# Patient Record
Sex: FEMALE | Race: WHITE | NOT HISPANIC OR LATINO | URBAN - METROPOLITAN AREA
[De-identification: names, ages, dates, MRNs, and addresses within clinical notes are randomized per-mention and may not be internally consistent; named-entity substitution may affect disease eponyms.]

---

## 2017-07-31 ENCOUNTER — EMERGENCY (EMERGENCY)
Facility: HOSPITAL | Age: 62
LOS: 1 days | Discharge: HOME | End: 2017-07-31
Attending: EMERGENCY MEDICINE | Admitting: EMERGENCY MEDICINE
Payer: COMMERCIAL

## 2017-07-31 VITALS
SYSTOLIC BLOOD PRESSURE: 153 MMHG | HEART RATE: 80 BPM | OXYGEN SATURATION: 97 % | RESPIRATION RATE: 20 BRPM | TEMPERATURE: 99 F | DIASTOLIC BLOOD PRESSURE: 86 MMHG

## 2017-07-31 PROCEDURE — 73502 X-RAY EXAM HIP UNI 2-3 VIEWS: CPT | Mod: 26,LT

## 2017-07-31 PROCEDURE — 99284 EMERGENCY DEPT VISIT MOD MDM: CPT

## 2017-07-31 PROCEDURE — 73502 X-RAY EXAM HIP UNI 2-3 VIEWS: CPT

## 2017-07-31 PROCEDURE — 99283 EMERGENCY DEPT VISIT LOW MDM: CPT | Mod: 25

## 2017-07-31 NOTE — ED PROVIDER NOTE - CONSTITUTIONAL, MLM
normal... Appears distressed 2/2 pain, awake, alert, oriented to person, place, time/situation and in no apparent distress.

## 2017-07-31 NOTE — ED PROVIDER NOTE - ATTENDING CONTRIBUTION TO CARE
[age] yo female presents with left hip and left lower back pain x 1 week; started s/p working out on treadmill; no fevers; no numbness; no weakness; no pain traveling down leg; on exam nad, no spinal tenderness; + left paraspinal lumbar tenderness; left superior posterior pelvic rim ttp; no swelling; no warmth of hip; no femur tenderness; full strength; no neurovascular deficits; will obtain xray, pain; control; will likely be able to f/u with ortho tomorrow

## 2017-07-31 NOTE — ED PROVIDER NOTE - OBJECTIVE STATEMENT
61 yo F w/ pmh HLD p/w L hip pain x 1 wk. Pt taking ibuprofen, oxycodone w/ minor pain relief. Pt was unable to get an ortho appt so came to ED. Pt denies trauma, major strain on L hip. Pt denies radiation, pain elsewhere. Pt only able to ambulate if flexing spine forward. Pain worse w/ standing and walking (10/10), but still present w/ sitting (8/10).     Took advil 30 min before arriving at ED

## 2017-08-01 ENCOUNTER — APPOINTMENT (OUTPATIENT)
Dept: ORTHOPEDIC SURGERY | Facility: CLINIC | Age: 62
End: 2017-08-01
Payer: COMMERCIAL

## 2017-08-01 VITALS
BODY MASS INDEX: 26.58 KG/M2 | WEIGHT: 150 LBS | HEIGHT: 63 IN | HEART RATE: 79 BPM | DIASTOLIC BLOOD PRESSURE: 84 MMHG | SYSTOLIC BLOOD PRESSURE: 134 MMHG

## 2017-08-01 DIAGNOSIS — Z78.9 OTHER SPECIFIED HEALTH STATUS: ICD-10-CM

## 2017-08-01 DIAGNOSIS — Z86.39 PERSONAL HISTORY OF OTHER ENDOCRINE, NUTRITIONAL AND METABOLIC DISEASE: ICD-10-CM

## 2017-08-01 DIAGNOSIS — Z80.9 FAMILY HISTORY OF MALIGNANT NEOPLASM, UNSPECIFIED: ICD-10-CM

## 2017-08-01 PROCEDURE — 73502 X-RAY EXAM HIP UNI 2-3 VIEWS: CPT

## 2017-08-01 PROCEDURE — 20610 DRAIN/INJ JOINT/BURSA W/O US: CPT | Mod: LT

## 2017-08-01 PROCEDURE — 99204 OFFICE O/P NEW MOD 45 MIN: CPT | Mod: 25

## 2017-08-08 ENCOUNTER — APPOINTMENT (OUTPATIENT)
Dept: ORTHOPEDIC SURGERY | Facility: CLINIC | Age: 62
End: 2017-08-08
Payer: COMMERCIAL

## 2017-08-08 VITALS — HEIGHT: 63 IN | BODY MASS INDEX: 26.58 KG/M2 | WEIGHT: 150 LBS

## 2017-08-08 PROCEDURE — 99214 OFFICE O/P EST MOD 30 MIN: CPT

## 2018-07-17 ENCOUNTER — OTHER (OUTPATIENT)
Age: 63
End: 2018-07-17

## 2018-07-22 PROBLEM — Z78.9 ALCOHOL USE: Status: ACTIVE | Noted: 2017-08-01

## 2018-12-10 PROBLEM — E78.5 HYPERLIPIDEMIA, UNSPECIFIED: Chronic | Status: ACTIVE | Noted: 2017-07-31

## 2019-01-04 ENCOUNTER — NON-APPOINTMENT (OUTPATIENT)
Age: 64
End: 2019-01-04

## 2019-01-04 ENCOUNTER — APPOINTMENT (OUTPATIENT)
Dept: PULMONOLOGY | Facility: CLINIC | Age: 64
End: 2019-01-04
Payer: COMMERCIAL

## 2019-01-04 VITALS
SYSTOLIC BLOOD PRESSURE: 130 MMHG | HEIGHT: 64 IN | BODY MASS INDEX: 31.24 KG/M2 | HEART RATE: 76 BPM | OXYGEN SATURATION: 97 % | DIASTOLIC BLOOD PRESSURE: 80 MMHG | WEIGHT: 183 LBS | RESPIRATION RATE: 16 BRPM

## 2019-01-04 DIAGNOSIS — Z86.19 PERSONAL HISTORY OF OTHER INFECTIOUS AND PARASITIC DISEASES: ICD-10-CM

## 2019-01-04 DIAGNOSIS — E66.3 OVERWEIGHT: ICD-10-CM

## 2019-01-04 DIAGNOSIS — Z82.49 FAMILY HISTORY OF ISCHEMIC HEART DISEASE AND OTHER DISEASES OF THE CIRCULATORY SYSTEM: ICD-10-CM

## 2019-01-04 DIAGNOSIS — Z87.828 PERSONAL HISTORY OF OTHER (HEALED) PHYSICAL INJURY AND TRAUMA: ICD-10-CM

## 2019-01-04 DIAGNOSIS — Z87.39 PERSONAL HISTORY OF OTHER DISEASES OF THE MUSCULOSKELETAL SYSTEM AND CONNECTIVE TISSUE: ICD-10-CM

## 2019-01-04 DIAGNOSIS — Z86.39 PERSONAL HISTORY OF OTHER ENDOCRINE, NUTRITIONAL AND METABOLIC DISEASE: ICD-10-CM

## 2019-01-04 DIAGNOSIS — Z87.898 PERSONAL HISTORY OF OTHER SPECIFIED CONDITIONS: ICD-10-CM

## 2019-01-04 DIAGNOSIS — M76.892 OTHER SPECIFIED ENTHESOPATHIES OF LEFT LOWER LIMB, EXCLUDING FOOT: ICD-10-CM

## 2019-01-04 DIAGNOSIS — Z83.3 FAMILY HISTORY OF DIABETES MELLITUS: ICD-10-CM

## 2019-01-04 DIAGNOSIS — J18.9 PNEUMONIA, UNSPECIFIED ORGANISM: ICD-10-CM

## 2019-01-04 PROCEDURE — 94618 PULMONARY STRESS TESTING: CPT

## 2019-01-04 PROCEDURE — 94010 BREATHING CAPACITY TEST: CPT | Mod: 59

## 2019-01-04 PROCEDURE — 71046 X-RAY EXAM CHEST 2 VIEWS: CPT

## 2019-01-04 PROCEDURE — 99406 BEHAV CHNG SMOKING 3-10 MIN: CPT

## 2019-01-04 PROCEDURE — 99204 OFFICE O/P NEW MOD 45 MIN: CPT | Mod: 25

## 2019-01-04 RX ORDER — ASPIRIN 81 MG
81 TABLET,CHEWABLE ORAL
Refills: 0 | Status: ACTIVE | COMMUNITY

## 2019-01-04 RX ORDER — METOPROLOL TARTRATE 75 MG/1
TABLET, FILM COATED ORAL
Refills: 0 | Status: ACTIVE | COMMUNITY

## 2019-01-04 RX ORDER — ROSUVASTATIN CALCIUM 5 MG/1
TABLET, FILM COATED ORAL
Refills: 0 | Status: ACTIVE | COMMUNITY

## 2019-01-04 RX ORDER — VENLAFAXINE HCL 50 MG
TABLET ORAL
Refills: 0 | Status: ACTIVE | COMMUNITY

## 2019-01-04 NOTE — REVIEW OF SYSTEMS
[Negative] : Sleep Disorder [Cough] : cough [Dyspnea] : dyspnea [Palpitations] : palpitations [As Noted in HPI] : as noted in HPI [Postnasal Drip] : postnasal drip

## 2019-01-07 NOTE — ADDENDUM
[FreeTextEntry1] : Documented by Gordon Coughlin acting as a scribe for Dr. Sunny Redmond on 01/04/2019.\par \par All medical record entries made by the Scribe were at my, Dr. Sunny Redmond's, direction and personally dictated by me on 01/04/2019. I have reviewed the chart and agree that the record accurately reflects my personal performance of the history, physical exam, assessment and plan. I have also personally directed, reviewed, and agree with the discharge instructions.

## 2019-01-07 NOTE — PHYSICAL EXAM
[General Appearance - Well Developed] : well developed [Normal Appearance] : normal appearance [Well Groomed] : well groomed [General Appearance - Well Nourished] : well nourished [No Deformities] : no deformities [General Appearance - In No Acute Distress] : no acute distress [Normal Conjunctiva] : the conjunctiva exhibited no abnormalities [Eyelids - No Xanthelasma] : the eyelids demonstrated no xanthelasmas [Normal Oropharynx] : normal oropharynx [Neck Appearance] : the appearance of the neck was normal [Neck Cervical Mass (___cm)] : no neck mass was observed [Jugular Venous Distention Increased] : there was no jugular-venous distention [Thyroid Diffuse Enlargement] : the thyroid was not enlarged [Thyroid Nodule] : there were no palpable thyroid nodules [Heart Rate And Rhythm] : heart rate and rhythm were normal [Heart Sounds] : normal S1 and S2 [Murmurs] : no murmurs present [Respiration, Rhythm And Depth] : normal respiratory rhythm and effort [Exaggerated Use Of Accessory Muscles For Inspiration] : no accessory muscle use [Auscultation Breath Sounds / Voice Sounds] : lungs were clear to auscultation bilaterally [Abdomen Soft] : soft [Abdomen Tenderness] : non-tender [Abdomen Mass (___ Cm)] : no abdominal mass palpated [Abnormal Walk] : normal gait [Gait - Sufficient For Exercise Testing] : the gait was sufficient for exercise testing [Nail Clubbing] : no clubbing of the fingernails [Cyanosis, Localized] : no localized cyanosis [Petechial Hemorrhages (___cm)] : no petechial hemorrhages [Skin Color & Pigmentation] : normal skin color and pigmentation [Skin Turgor] : normal skin turgor [] : no rash [Deep Tendon Reflexes (DTR)] : deep tendon reflexes were 2+ and symmetric [Sensation] : the sensory exam was normal to light touch and pinprick [No Focal Deficits] : no focal deficits [Oriented To Time, Place, And Person] : oriented to person, place, and time [Impaired Insight] : insight and judgment were intact [Affect] : the affect was normal [III] : III [FreeTextEntry1] : I:E ratio 1:3; clear

## 2019-01-07 NOTE — HISTORY OF PRESENT ILLNESS
[FreeTextEntry1] : Ms. Zuniga 63 years old with a history of hypertension, elevated cholesterol, hyperthyroidism, anxiety, prior pneumonia now comes to office for pulmonary evaluation. \par -she states she would sleep only 2 hours for the past 2 years\par -she states she has been taking care of her ill family members\par -she states she has SOB with stairs\par -she states she goes to the gym 5 days a week\par -she notes she had pneumonia 30 years ago, has resolved\par -she states she had bronchitis recently, has been resolved\par -she states she has heartburn infrequently\par -she states she coughs but does not wheeze\par -she states she has been congested \par -she states she wears glasses\par -she notes sense of taste and smell is good\par -she notes she felt palpitations and wore monitor\par -she states she had nodules on thyroid\par -she states can sleep in a car and watching television\par -she states she is congested now\par -she states her weight is stable\par -she reports her joints hurt with cold air\par -she states her 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

## 2019-01-07 NOTE — ASSESSMENT
[FreeTextEntry1] : Ms. Zuniga 63 years old with a history of hypertension, elevated cholesterol, hyperthyroidism, anxiety, prior pneumonia now comes to office for pulmonary evaluation.\par \par Shortness of breath is multifactorial: \par overweight\par poor mechanics of breathing\par ?cardiac disease (Dr. Sanchez)\par lung reasons--? copd/asthma\par \par problem 1: ? asthma/COPD (due to smoker 30 years)\par - recommend Methacholine challenge to detect asthma\par Asthma is believed to be caused by inherited (genetic) and environmental factor, but its exact cause is\par unknown. Asthma may be triggered by allergens, lung infections, or irritants in the air. Asthma triggers are\par different for each person\par -COPD is a progressive disease and although it can’t be cured , appropriate management can slow its\par progression, reduce frequency and severity of exacerbations, and improve symptoms and the patient\par quality of life. Hospitalizations are the greatest contributor to the total COPD costs and account for up to\par 87% of total COPD related costs. Exacerbations are the main cause of admissions and subsequently\par account for the 40-75% of COPD costs. Inhaled maintenance therapy reduces the incidence of\par exacerbations in patients with stable COPD. Incorrect inhaler use and nonadherence are major obstacles\par to achieving COPD treatment goals. Many COPD patients have challenges (impaired inhalation, limited\par dexterity, reduced cognition: that limit their ability to correctly use their COPD treatment devices resulting\par in reduced symptom control. Of most importance is smoking cessation and early intervention with\par respiratory illnesses and contemplation for pulmonary rehab to enhance quality of life. \par \par problem 2: post nasal drip\par -  recommended Xlear saline solution \par \par problem 3: nicotine addiction\par -add Nicotrol inhaler\par -recommended to see [facility] for assistance \par Discussed for five minutes with the patient the risks/associations with continued smoking including COPD,\par emphysema, shortness of breath, renal cancer, bladder cancer, stroke risk, cardiac disease, etc. Smoking\par cessation was discussed at length and highly encouraged. Various options to aid cessation was\par discussed including use of Chantix, Nicotrol, nicotine products, laser therapy, hypnosis, Wellbutrin, etc\par \par \par problem 4: lung cancer screening/abnormal CT of Chest\par -2 different types of nodules: 1 possibly due to measles and chicken pox and 1 groundglass nodule possibly related to bronchitis or pneumonia\par -recommended f/u CT Scan April 2019.\par \par problem 5: r/o SEA\par risks \par      snoring \par       elevated Mallampati cleft\par       fatigue\par -recommended home sleep study\par -recommended mouthpiece \par - Sleep apnea is associated with adverse clinical consequences which an affect most organ systems.\par Cardiovascular disease risk includes arrhythmias, atrial fibrillation, hypertension, coronary artery disease,\par and stroke. Metabolic disorders include diabetes type 2, non-alcoholic fatty liver disease. Mood disorder\par especially depression; and cognitive decline especially in the elderly. Associations with chronic\par reflux/Montilla’s esophagus some but not all inclusive.\par -Reasons include arousal consistent with hypopnea; respiratory events most prominent in REM sleep or\par supine position; therefore sleep staging and body position are important for accurate diagnosis and\par estimation of AHI.\par Good sleep hygiene was encouraged including avoiding watching television an hour before bed, keeping\par caffeine at a low, avoiding reading, television, or anything, in bed, no drinking any liquids three hours\par before bedtime, and only getting into bed when tired and ready for sleep.\par \par problem 6: overweight\par -Weight loss, exercise, and diet control were discussed and are highly encouraged. Treatment options\par were given such as, aqua therapy, and contacting a nutritionist. Recommended to use the elliptical,\par stationary bike, less use of treadmill. Mindful eating was explained to the patient Obesity is associated\par with worsening asthma, shortness of breath, and potential for cardiac disease, diabetes, and other\par underlying medical conditions. \par \par problem 7: health maintenance\par Problem : health maintenance\par -recommended influenza vaccine\par -recommended strep pneumonia vaccines: Prevnar-13 vaccine, followed by Pneumo vaccine 23 one year\par following\par -recommended early intervention for URIs\par -recommended regular osteoporosis evaluations\par -recommended early dermatological evaluations\par -recommended after the age of 50 to the age of 70, colonoscopy every 5 years\par \par

## 2019-01-28 ENCOUNTER — OUTPATIENT (OUTPATIENT)
Dept: OUTPATIENT SERVICES | Facility: HOSPITAL | Age: 64
LOS: 1 days | End: 2019-01-28
Payer: COMMERCIAL

## 2019-01-28 ENCOUNTER — APPOINTMENT (OUTPATIENT)
Dept: SLEEP CENTER | Facility: CLINIC | Age: 64
End: 2019-01-28
Payer: COMMERCIAL

## 2019-01-28 PROCEDURE — 95806 SLEEP STUDY UNATT&RESP EFFT: CPT

## 2019-01-28 PROCEDURE — 95806 SLEEP STUDY UNATT&RESP EFFT: CPT | Mod: 26

## 2019-01-31 DIAGNOSIS — G47.33 OBSTRUCTIVE SLEEP APNEA (ADULT) (PEDIATRIC): ICD-10-CM

## 2019-05-23 ENCOUNTER — APPOINTMENT (OUTPATIENT)
Dept: PULMONOLOGY | Facility: CLINIC | Age: 64
End: 2019-05-23

## 2019-05-23 ENCOUNTER — NON-APPOINTMENT (OUTPATIENT)
Age: 64
End: 2019-05-23

## 2019-05-23 VITALS
RESPIRATION RATE: 16 BRPM | HEART RATE: 83 BPM | DIASTOLIC BLOOD PRESSURE: 80 MMHG | WEIGHT: 180 LBS | OXYGEN SATURATION: 98 % | HEIGHT: 63 IN | BODY MASS INDEX: 31.89 KG/M2 | SYSTOLIC BLOOD PRESSURE: 130 MMHG

## 2019-05-23 NOTE — ASSESSMENT
[FreeTextEntry1] : Ms. Zuniga 63 years old with a history of hypertension, elevated cholesterol, hyperthyroidism, anxiety, prior pneumonia now comes to office for pulmonary evaluation.\par \par Shortness of breath is multifactorial: \par overweight\par poor mechanics of breathing\par ?cardiac disease (Dr. Sanchez)\par lung reasons--? copd/asthma\par \par problem 1: ? asthma/COPD (due to smoker 30 years)\par - recommend Methacholine challenge to detect asthma\par Asthma is believed to be caused by inherited (genetic) and environmental factor, but its exact cause is\par unknown. Asthma may be triggered by allergens, lung infections, or irritants in the air. Asthma triggers are\par different for each person\par -COPD is a progressive disease and although it can’t be cured , appropriate management can slow its\par progression, reduce frequency and severity of exacerbations, and improve symptoms and the patient\par quality of life. Hospitalizations are the greatest contributor to the total COPD costs and account for up to\par 87% of total COPD related costs. Exacerbations are the main cause of admissions and subsequently\par account for the 40-75% of COPD costs. Inhaled maintenance therapy reduces the incidence of\par exacerbations in patients with stable COPD. Incorrect inhaler use and nonadherence are major obstacles\par to achieving COPD treatment goals. Many COPD patients have challenges (impaired inhalation, limited\par dexterity, reduced cognition: that limit their ability to correctly use their COPD treatment devices resulting\par in reduced symptom control. Of most importance is smoking cessation and early intervention with\par respiratory illnesses and contemplation for pulmonary rehab to enhance quality of life. \par \par problem 2: post nasal drip\par -  recommended Xlear saline solution \par \par problem 3: nicotine addiction\par -add Nicotrol inhaler\par -recommended to see [facility] for assistance \par Discussed for five minutes with the patient the risks/associations with continued smoking including COPD,\par emphysema, shortness of breath, renal cancer, bladder cancer, stroke risk, cardiac disease, etc. Smoking\par cessation was discussed at length and highly encouraged. Various options to aid cessation was\par discussed including use of Chantix, Nicotrol, nicotine products, laser therapy, hypnosis, Wellbutrin, etc\par \par \par problem 4: lung cancer screening/abnormal CT of Chest\par -2 different types of nodules: 1 possibly due to measles and chicken pox and 1 groundglass nodule possibly related to bronchitis or pneumonia\par -recommended f/u CT Scan April 2019.\par \par problem 5: r/o SEA\par risks \par      snoring \par       elevated Mallampati cleft\par       fatigue\par -recommended home sleep study\par -recommended mouthpiece \par - Sleep apnea is associated with adverse clinical consequences which an affect most organ systems.\par Cardiovascular disease risk includes arrhythmias, atrial fibrillation, hypertension, coronary artery disease,\par and stroke. Metabolic disorders include diabetes type 2, non-alcoholic fatty liver disease. Mood disorder\par especially depression; and cognitive decline especially in the elderly. Associations with chronic\par reflux/Montilla’s esophagus some but not all inclusive.\par -Reasons include arousal consistent with hypopnea; respiratory events most prominent in REM sleep or\par supine position; therefore sleep staging and body position are important for accurate diagnosis and\par estimation of AHI.\par Good sleep hygiene was encouraged including avoiding watching television an hour before bed, keeping\par caffeine at a low, avoiding reading, television, or anything, in bed, no drinking any liquids three hours\par before bedtime, and only getting into bed when tired and ready for sleep.\par \par problem 6: overweight\par -Weight loss, exercise, and diet control were discussed and are highly encouraged. Treatment options\par were given such as, aqua therapy, and contacting a nutritionist. Recommended to use the elliptical,\par stationary bike, less use of treadmill. Mindful eating was explained to the patient Obesity is associated\par with worsening asthma, shortness of breath, and potential for cardiac disease, diabetes, and other\par underlying medical conditions. \par \par problem 7: health maintenance\par Problem : health maintenance\par -recommended influenza vaccine\par -recommended strep pneumonia vaccines: Prevnar-13 vaccine, followed by Pneumo vaccine 23 one year\par following\par -recommended early intervention for URIs\par -recommended regular osteoporosis evaluations\par -recommended early dermatological evaluations\par -recommended after the age of 50 to the age of 70, colonoscopy every 5 years\par \par

## 2019-05-23 NOTE — REASON FOR VISIT
[Follow-Up] : a follow-up visit [FreeTextEntry1] : abnormal lung CT, nicotine addiction, PND, poor sleep, and SOB

## 2019-05-23 NOTE — ADDENDUM
[FreeTextEntry1] : All medical record entries made by quentin Davies were at Dr. Sunny Redmond's, direction and personally dictated by me on 05/23/2019. I have reviewed the chart and agree that the record accurately reflects my personal performance of the history, physical exam, assessment and plan. I have also personally directed, reviewed, and agree with the discharge instructions.

## 2019-05-23 NOTE — PHYSICAL EXAM

## 2019-05-23 NOTE — HISTORY OF PRESENT ILLNESS
[FreeTextEntry1] : Ms. Zuniga is a 64 year old female with a history of abnormal lung CT, nicotine addiction, PND, poor sleep, and SOB presenting to the office today for a follow up visit. Her chief complaint is\par \par -she denies any headaches, nausea, vomiting, fever, chills, sweats, chest pain, chest pressure, diarrhea, constipation, dysphagia, dizziness, leg swelling, leg pain, itchy eyes, itchy ears, heartburn, reflux, or sour taste in the mouth.

## 2019-05-28 ENCOUNTER — APPOINTMENT (OUTPATIENT)
Dept: PULMONOLOGY | Facility: CLINIC | Age: 64
End: 2019-05-28
Payer: MEDICARE

## 2019-05-28 VITALS
DIASTOLIC BLOOD PRESSURE: 82 MMHG | OXYGEN SATURATION: 97 % | HEART RATE: 75 BPM | WEIGHT: 180 LBS | RESPIRATION RATE: 16 BRPM | SYSTOLIC BLOOD PRESSURE: 124 MMHG | BODY MASS INDEX: 31.89 KG/M2 | HEIGHT: 63 IN

## 2019-05-28 PROCEDURE — 94010 BREATHING CAPACITY TEST: CPT

## 2019-05-28 PROCEDURE — 99214 OFFICE O/P EST MOD 30 MIN: CPT | Mod: 25

## 2019-05-28 NOTE — PROCEDURE
[FreeTextEntry1] : PFT (5/23/2019) - spi reveals normal flows; FEV1 was 2.52L which is 108% of predicted; normal flow volume loop \par \par Chest CT (4/17/2019) revealed multiple noncalcified solid and sub solid pulmonary nodules, all measuring less than 5 mm. There is no aggressive chest wall mass. Lingular atelectasis is present. Mild coronary artery calcifications. \par \par Home Sleep Study (1/28/2019) revealed an AHI of 4.2 and a snore index of 30.0. There was no significant sleep disordered breathing. Findings are c/w primary snoring.

## 2019-05-28 NOTE — ADDENDUM
[FreeTextEntry1] : Documented by Francois Garcia acting as a scribe for Dr. Sunny Redmond on 5/28/2019\par \par All medical record entries made by the Scribe were at my, Dr. Sunny Redmond's, direction and personally dictated by me on 5/28/2019. I have reviewed the chart and agree that the record accurately reflects my personal performance of the history, physical exam, assessment and plan. I have also personally directed, reviewed, and agree with the discharge instructions. \par \par \par \par \par

## 2019-05-28 NOTE — HISTORY OF PRESENT ILLNESS
[FreeTextEntry1] : Ms. Zuniga is a 64 year old female with a history of abnormal lung CT, nicotine addiction, PND, poor sleep, and SOB presenting to the office today for a follow up visit. Her chief complaint is rotator cuff pain.\par - She comes in stating that she is feeling great aside form her rotator cuff pain. She received a cortisone injection, which has improved her pain slightly, but she continues to have a constant discomfort. \par - She continues to smoke around 1 ppd. She expresses a desire to quit smoking but has been largely unsuccessful. \par - She exercises everyday though she is distressed regarding her perceived weight gain. \par - She reports an intermittent cough and SOB with climbing stairs. \par - she continues to smoke. \par - She denies any headaches, nausea, vomiting, fever, chills, sweats, chest pain, chest pressure, palpitations, fatigue, diarrhea, constipation, dysphagia, myalgias, dizziness, leg swelling, leg pain, itchy eyes, itchy ears, heartburn, reflux, or sour taste in the mouth.

## 2019-05-28 NOTE — ASSESSMENT
[FreeTextEntry1] : Ms. Zuniga 63 years old with a history of hypertension, elevated cholesterol, hyperthyroidism, anxiety, prior pneumonia now comes to office for pulmonary re-evaluation. She continues to smoke around 1 ppd. \par \par Shortness of breath is multifactorial: \par overweight\par poor mechanics of breathing\par ?cardiac disease (Dr. Sanchez)\par lung reasons--? copd/asthma\par \par problem 1: ? asthma/COPD (due to smoker 30 years)\par - recommend Methacholine challenge to detect asthma\par - Ad Ventolin 2 inhalations q6H\par Asthma is believed to be caused by inherited (genetic) and environmental factor, but its exact cause is\par unknown. Asthma may be triggered by allergens, lung infections, or irritants in the air. Asthma triggers are\par different for each person\par -COPD is a progressive disease and although it can’t be cured , appropriate management can slow its\par progression, reduce frequency and severity of exacerbations, and improve symptoms and the patient\par quality of life. Hospitalizations are the greatest contributor to the total COPD costs and account for up to\par 87% of total COPD related costs. Exacerbations are the main cause of admissions and subsequently\par account for the 40-75% of COPD costs. Inhaled maintenance therapy reduces the incidence of\par exacerbations in patients with stable COPD. Incorrect inhaler use and nonadherence are major obstacles\par to achieving COPD treatment goals. Many COPD patients have challenges (impaired inhalation, limited\par dexterity, reduced cognition: that limit their ability to correctly use their COPD treatment devices resulting\par in reduced symptom control. Of most importance is smoking cessation and early intervention with\par respiratory illnesses and contemplation for pulmonary rehab to enhance quality of life. \par \par problem 2: post nasal drip\par -  recommended Xlear saline solution \par \par problem 3: nicotine addiction\par -Refused Nicotrol inhaler\par - Consider Wellbutrin/ Chantix \par -recommended to see [facility] for assistance \par Discussed for five minutes with the patient the risks/associations with continued smoking including COPD,\par emphysema, shortness of breath, renal cancer, bladder cancer, stroke risk, cardiac disease, etc. Smoking\par cessation was discussed at length and highly encouraged. Various options to aid cessation was\par discussed including use of Chantix, Nicotrol, nicotine products, laser therapy, hypnosis, Wellbutrin, etc\par \par \par problem 4: lung cancer screening/abnormal CT of Chest\par -2 different types of nodules: 1 possibly due to measles and chicken pox and 1 groundglass nodule possibly related to bronchitis or pneumonia\par - S/p chest CT 4/2019; next CT 8/2019 (if there are changes a biopsy will be needed)\par \par problem 5: Primary Snoring (SEA NOT Present)\par risks: snoring, elevated Mallampati cleft, fatigue\par - S/p home sleep study\par -recommended mouthpiece \par - Recommended Oxy-Aid \par - Sleep apnea is associated with adverse clinical consequences which an affect most organ systems.\par Cardiovascular disease risk includes arrhythmias, atrial fibrillation, hypertension, coronary artery disease,\par and stroke. Metabolic disorders include diabetes type 2, non-alcoholic fatty liver disease. Mood disorder\par especially depression; and cognitive decline especially in the elderly. Associations with chronic\par reflux/Montilla’s esophagus some but not all inclusive.\par -Reasons include arousal consistent with hypopnea; respiratory events most prominent in REM sleep or\par supine position; therefore sleep staging and body position are important for accurate diagnosis and\par estimation of AHI.\par Good sleep hygiene was encouraged including avoiding watching television an hour before bed, keeping\par caffeine at a low, avoiding reading, television, or anything, in bed, no drinking any liquids three hours\par before bedtime, and only getting into bed when tired and ready for sleep.\par \par problem 6: overweight\par -Weight loss, exercise, and diet control were discussed and are highly encouraged. Treatment options\par were given such as, aqua therapy, and contacting a nutritionist. Recommended to use the elliptical,\par stationary bike, less use of treadmill. Mindful eating was explained to the patient Obesity is associated\par with worsening asthma, shortness of breath, and potential for cardiac disease, diabetes, and other\par underlying medical conditions. \par \par problem 7: health maintenance\par Problem : health maintenance\par -recommended influenza vaccine\par -recommended strep pneumonia vaccines: Prevnar-13 vaccine, followed by Pneumo vaccine 23 one year\par following\par -recommended early intervention for URIs\par -recommended regular osteoporosis evaluations\par -recommended early dermatological evaluations\par -recommended after the age of 50 to the age of 70, colonoscopy every 5 years\par \par

## 2019-05-28 NOTE — PHYSICAL EXAM
[General Appearance - Well Developed] : well developed [Well Groomed] : well groomed [Normal Appearance] : normal appearance [No Deformities] : no deformities [General Appearance - In No Acute Distress] : no acute distress [General Appearance - Well Nourished] : well nourished [Eyelids - No Xanthelasma] : the eyelids demonstrated no xanthelasmas [Normal Oropharynx] : normal oropharynx [Normal Conjunctiva] : the conjunctiva exhibited no abnormalities [II] : II [Neck Appearance] : the appearance of the neck was normal [Thyroid Diffuse Enlargement] : the thyroid was not enlarged [Neck Cervical Mass (___cm)] : no neck mass was observed [Jugular Venous Distention Increased] : there was no jugular-venous distention [Thyroid Nodule] : there were no palpable thyroid nodules [Heart Rate And Rhythm] : heart rate and rhythm were normal [Heart Sounds] : normal S1 and S2 [Murmurs] : no murmurs present [Auscultation Breath Sounds / Voice Sounds] : lungs were clear to auscultation bilaterally [Exaggerated Use Of Accessory Muscles For Inspiration] : no accessory muscle use [Respiration, Rhythm And Depth] : normal respiratory rhythm and effort [Abdomen Tenderness] : non-tender [Abdomen Soft] : soft [Abdomen Mass (___ Cm)] : no abdominal mass palpated [Gait - Sufficient For Exercise Testing] : the gait was sufficient for exercise testing [Nail Clubbing] : no clubbing of the fingernails [Abnormal Walk] : normal gait [Cyanosis, Localized] : no localized cyanosis [Petechial Hemorrhages (___cm)] : no petechial hemorrhages [No Venous Stasis] : no venous stasis [] : no rash [Skin Color & Pigmentation] : normal skin color and pigmentation [Skin Lesions] : no skin lesions [No Skin Ulcers] : no skin ulcer [No Xanthoma] : no  xanthoma was observed [Deep Tendon Reflexes (DTR)] : deep tendon reflexes were 2+ and symmetric [No Focal Deficits] : no focal deficits [Oriented To Time, Place, And Person] : oriented to person, place, and time [Sensation] : the sensory exam was normal to light touch and pinprick [Affect] : the affect was normal [Impaired Insight] : insight and judgment were intact [FreeTextEntry1] : I:E ratio 1:3; clear

## 2019-08-01 ENCOUNTER — APPOINTMENT (OUTPATIENT)
Dept: ORTHOPEDIC SURGERY | Facility: CLINIC | Age: 64
End: 2019-08-01
Payer: MEDICARE

## 2019-08-01 VITALS
WEIGHT: 180 LBS | HEIGHT: 63 IN | SYSTOLIC BLOOD PRESSURE: 140 MMHG | DIASTOLIC BLOOD PRESSURE: 85 MMHG | HEART RATE: 67 BPM | BODY MASS INDEX: 31.89 KG/M2

## 2019-08-01 DIAGNOSIS — M19.011 PRIMARY OSTEOARTHRITIS, RIGHT SHOULDER: ICD-10-CM

## 2019-08-01 PROCEDURE — 99214 OFFICE O/P EST MOD 30 MIN: CPT

## 2019-08-29 ENCOUNTER — TRANSCRIPTION ENCOUNTER (OUTPATIENT)
Age: 64
End: 2019-08-29

## 2019-08-29 ENCOUNTER — APPOINTMENT (OUTPATIENT)
Dept: PULMONOLOGY | Facility: CLINIC | Age: 64
End: 2019-08-29
Payer: MEDICARE

## 2019-08-29 ENCOUNTER — RX RENEWAL (OUTPATIENT)
Age: 64
End: 2019-08-29

## 2019-08-29 VITALS
HEIGHT: 63 IN | DIASTOLIC BLOOD PRESSURE: 70 MMHG | HEART RATE: 80 BPM | BODY MASS INDEX: 31.36 KG/M2 | SYSTOLIC BLOOD PRESSURE: 130 MMHG | WEIGHT: 177 LBS | OXYGEN SATURATION: 98 % | RESPIRATION RATE: 16 BRPM

## 2019-08-29 DIAGNOSIS — R05 COUGH: ICD-10-CM

## 2019-08-29 PROCEDURE — 99214 OFFICE O/P EST MOD 30 MIN: CPT

## 2019-09-04 NOTE — REVIEW OF SYSTEMS
[Fever] : no fever [Chills] : chills [Poor Appetite] : poor appetite [Fatigue] : fatigue [Ear Disturbance] : no ear disturbance [Postnasal Drip] : no postnasal drip [Sore Throat] : no sore throat [Cough] : cough [Sputum] : sputum  [Chest Tightness] : chest tightness [Dyspnea] : no dyspnea [As Noted in HPI] : as noted in HPI [Nasal Discharge] : nasal discharge [Wheezing] : no wheezing [Headache] : headache [Difficulty Maintaining Sleep] : difficulty maintaining sleep [Nonrestorative Sleep] : nonrestorative sleep [Negative] : Pulmonary Hypertension [de-identified] : poor sleep due to illness [FreeTextEntry2] : mild sinus pressure

## 2019-09-04 NOTE — ASSESSMENT
[FreeTextEntry1] : The plan for the patient is as follows: \par \par #1.Acute bronchitis\par -add doxycycline 100 mg PO BID x 10 days\par \par #2.Cough- worsened by RADS/asthma/copd?- given smoking history\par -add promethazine DM 5-10 ml Q 4-6 hours\par -add symbicort 160 2 puffs BID rinse and gargle (sample given to patient)\par \par #3.Asthma?\par -encouraged MCT follow up once better\par \par #4.mucus/sinus\par -add fluticasone nasal spray 1 spray each nostril am and pm x 10 days-14 days\par -add OTC claritin or zyrtec\par \par #5. Nicotine addiction\par -Refused Nicotrol inhaler or other meds\par - Consider Wellbutrin/ Chantix, pt reports not ready to quit \par -recommended to see [facility] for assistance or [place] [employer] hotline\par Discussed for five minutes with the patient the risks/associations with continued smoking including COPD,\par emphysema, shortness of breath, renal cancer, bladder cancer, stroke risk, cardiac disease, etc. Smoking\par cessation was discussed at length and highly encouraged. Various options to aid cessation was\par discussed including use of Chantix, Nicotrol, nicotine products, laser therapy, hypnosis, Wellbutrin, etc\par \par #6.lung cancer screening/abnormal CT of Chest\par -2 different types of nodules: 1 possibly due to measles and chicken pox and 1 groundglass nodule possibly related to bronchitis or pneumonia\par - S/p chest CT 4/2019; next CT 8/2019 (if there are changes a biopsy will be needed)\par -she is due for scan, but can wait a few weeks to get this completed given acute illness\par \par Pt to call with update next week if issues or not getting better. \par Follow up as scheduled with Dr. Redmond\par We will call her with CT scan results. \par

## 2019-09-04 NOTE — PHYSICAL EXAM
[General Appearance - Well Developed] : well developed [Well Groomed] : well groomed [Normal Appearance] : normal appearance [General Appearance - Well Nourished] : well nourished [General Appearance - In No Acute Distress] : no acute distress [No Deformities] : no deformities [Eyelids - No Xanthelasma] : the eyelids demonstrated no xanthelasmas [Normal Oropharynx] : normal oropharynx [Normal Conjunctiva] : the conjunctiva exhibited no abnormalities [Neck Appearance] : the appearance of the neck was normal [II] : II [Neck Cervical Mass (___cm)] : no neck mass was observed [Jugular Venous Distention Increased] : there was no jugular-venous distention [Heart Rate And Rhythm] : heart rate and rhythm were normal [Murmurs] : no murmurs present [Heart Sounds] : normal S1 and S2 [Respiration, Rhythm And Depth] : normal respiratory rhythm and effort [Auscultation Breath Sounds / Voice Sounds] : lungs were clear to auscultation bilaterally [Exaggerated Use Of Accessory Muscles For Inspiration] : no accessory muscle use [FreeTextEntry1] : I:E ratio 1:3; clear  [Abdomen Soft] : soft [Abnormal Walk] : normal gait [Gait - Sufficient For Exercise Testing] : the gait was sufficient for exercise testing [Cyanosis, Localized] : no localized cyanosis [Nail Clubbing] : no clubbing of the fingernails [Skin Color & Pigmentation] : normal skin color and pigmentation [] : no rash [No Focal Deficits] : no focal deficits [Oriented To Time, Place, And Person] : oriented to person, place, and time [Impaired Insight] : insight and judgment were intact [Affect] : the affect was normal [Mood] : the mood was normal

## 2019-09-04 NOTE — HISTORY OF PRESENT ILLNESS
[FreeTextEntry1] : Ms. Zuniga is a 64 year old female with a history of abnormal lung CT, nicotine addiction, PND, poor sleep, and SOB presenting to the office today for an acute visit. \par \par She reports she has been feeling unwell x 5 days. She started with sore throat and that has resolved. She now has cough with green phlegm, chest hurts from coughing, ear pain, HA, chest tightness, sinus congestion/pressure and poor sleep due to cough and blowing nose. She reports she did have chills initially. Her appetite is also decreased.\par \par She never went for MCT.\par She denies fever, GERD symptoms, n/v/abdominal pain, post nasal drip, sore throat, ear pain, dizziness, rashes or muscle cramps. \par She continues to smoke 1/2 pack to 1 pack per day.

## 2019-09-05 RX ORDER — AZITHROMYCIN 500 MG/1
500 TABLET, FILM COATED ORAL
Qty: 5 | Refills: 0 | Status: DISCONTINUED | COMMUNITY
Start: 2019-09-04 | End: 2019-09-05

## 2019-09-07 PROBLEM — M19.011 PRIMARY OSTEOARTHRITIS OF RIGHT SHOULDER: Status: ACTIVE | Noted: 2019-09-07

## 2019-09-07 NOTE — HISTORY OF PRESENT ILLNESS
[de-identified] : 63 year old female presents today with right shoulder pain x 6 months. She received cortisone injection from outside orthopedist which was not helpful. She has had only 3 PT sessions which was not helpful. The pain is intermittent brought own with cross body, overhead, and  internal rotation. Naproxen is minimally helpful. She brought MRI from [facility] for review. \par \par The patient's past medical history, past surgical history, medications and allergies were reviewed by me today with the patient and documented accordingly. In addition, the patient's family and social history, which were noncontributory to this visit, were reviewed also.

## 2019-09-07 NOTE — DISCUSSION/SUMMARY
[de-identified] : 64-year-old female with right shoulder pain\par \par Patient has symptoms consistent with early degenerative process to the right shoulder. There is early degenerative glenohumeral arthritis as well as biceps tendinopathy. Patient also has an insertional rotator cuff change and partial tearing. There is concern given the degenerative process that this may not respond well to surgical intervention with arthroscopy. Patient is trying conservative management at this time with minimal effect. Would recommend continue trial of physical therapy modalities, and I discussed use of surgical arthroscopy as a possible alternative with glenohumeral debridement, biceps tenotomy, as well as debridement of rotator cuff and subacromial space. This may provide some temporary relief, but likely will continue to progress towards glenohumeral joint arthrosis that may require future arthroplasty.\par \par Patient voiced understanding.\par \par Recommendation: Continued PT. Continued conservative management with rest, ice, activity modification.\par \par Followup 2-3mos as needed

## 2019-09-07 NOTE — PHYSICAL EXAM
[de-identified] : Images were reviewed from [facility] dated 5.23.19. \par \par Multiple images right shoulder showed no evidence of bony injury, or hal dislocation. There is mild underlying degenerative arthritic change seen. Overall alignment is maintained. Rotator cuff degenerative insertional changes\par \par MRI right shoulder dated 7.31.19 shows evidence of mild glenohumeral arthritis with thickening of the synovium and biceps tendinosis. Possible partial biceps tear. Partial tear rotator cuff. [de-identified] : Oriented to time, place, person\par Mood: Normal\par Affect: Normal\par Appearance: Healthy, well appearing, no acute distress.\par Gait: Normal\par Assistive Devices: None\par \par Right shoulder exam\par \par Inspection: No malalignment, No defects, No atrophy\par Skin: No masses, No lesions\par Neck: Negative Spurlings, full ROM, no pain with ROM\par AROM: FF to 160, abduction to 85, ER to 40, IR to mid lumbar\par PROM: FF to 180, abduction to 90, ER to 70\par Painful arc ROM: Painful passive motion, painful active motion.\par Tenderness: Positive bicipital tenderness, positive tenderness to the greater tuberosity/RTC insertion, positive anterior shoulder/lesser tuberosity tenderness\par Strength: 4+/5 ER, 5/5 IR in adduction, 4+/5 supraspinatus testing, positive O'Briens test\par AC Joint: No ttp/pain with cross arm testing\par Biceps: Speed Negative, Yergusons Negative\par Impingement test: Positive Terrell, Positive Neer \par Stability: Stable\par Vasc: 2+ radial pulse\par Neuro: AIN, PIN, Ulnar nerve in tact to motor\par Sensation: In tact to light touch throughout\par \par

## 2019-10-03 ENCOUNTER — NON-APPOINTMENT (OUTPATIENT)
Age: 64
End: 2019-10-03

## 2019-10-03 ENCOUNTER — APPOINTMENT (OUTPATIENT)
Dept: PULMONOLOGY | Facility: CLINIC | Age: 64
End: 2019-10-03
Payer: MEDICARE

## 2019-10-03 VITALS
WEIGHT: 180 LBS | HEART RATE: 72 BPM | SYSTOLIC BLOOD PRESSURE: 130 MMHG | RESPIRATION RATE: 16 BRPM | HEIGHT: 62 IN | DIASTOLIC BLOOD PRESSURE: 80 MMHG | BODY MASS INDEX: 33.13 KG/M2 | OXYGEN SATURATION: 97 %

## 2019-10-03 DIAGNOSIS — Z87.09 PERSONAL HISTORY OF OTHER DISEASES OF THE RESPIRATORY SYSTEM: ICD-10-CM

## 2019-10-03 PROCEDURE — 99214 OFFICE O/P EST MOD 30 MIN: CPT | Mod: 25

## 2019-10-03 PROCEDURE — 94010 BREATHING CAPACITY TEST: CPT

## 2019-10-03 RX ORDER — PROMETHAZINE HYDROCHLORIDE AND DEXTROMETHORPHAN HYDROBROMIDE ORAL SOLUTION 15; 6.25 MG/5ML; MG/5ML
6.25-15 SOLUTION ORAL
Qty: 240 | Refills: 0 | Status: DISCONTINUED | COMMUNITY
Start: 2019-08-29 | End: 2019-10-03

## 2019-10-03 RX ORDER — DOXYCYCLINE 100 MG/1
100 CAPSULE ORAL
Qty: 20 | Refills: 0 | Status: DISCONTINUED | COMMUNITY
Start: 2019-08-29 | End: 2019-10-03

## 2019-10-03 RX ORDER — CEFUROXIME AXETIL 500 MG/1
500 TABLET ORAL
Qty: 20 | Refills: 0 | Status: DISCONTINUED | COMMUNITY
Start: 2019-09-05 | End: 2019-10-03

## 2019-10-03 NOTE — PROCEDURE
[FreeTextEntry1] : Chest CT (9.25.19) reveals interval resolution of a few small lung nodules previously seen within the left upper, right upper, and right middle lobe since the prior study.  Area of scarring/atelectasis within the lingula of the left upper lobe unchanged. Bilateral thyroid nodules as noted on a prior thyroid ultrasound study.\par \par PFT - spi reveals normal flows; FEV1 is 2.46 which is 110% of predicted, normal flow volume loop

## 2019-10-03 NOTE — ADDENDUM
[FreeTextEntry1] : All medical record entries made by quentin Davies were at Dr. Sunny Redmond's direction and personally dictated by me on 10/03/2019. I have reviewed the chart and agree that the record accurately reflects my personal performance of the history, physical exam, assessment and plan. I have also personally directed, reviewed, and agree with the discharge instructions. \par \par \par

## 2019-10-03 NOTE — HISTORY OF PRESENT ILLNESS
[FreeTextEntry1] : Ms. Zuniga is a 64 year old female with a history of abnormal lung CT, nicotine addiction, PND, poor sleep, and SOB presenting to the office today for a follow up visit. Her chief complaint is weight.\par -She states that she has generally been feeling well\par -she recently had bronchitis and f/u with [facility] she has since been feeling better\par -she notes that she has not been sleeping well, as she has been under a significant amount of stress. she typically has difficulty staying asleep throughout the night, and often is unable to get more than 3-4 hours of uninterrupted sleep\par -she has been exercising regularly with about 20 minutes of cardio, 20 minutes of stretching, and 20 minutes of weight training. she feels no orthopedic limitations while exercising \par -she has still been smoking. she has been unable to reduce her smoking frequency\par -she reports that her sinuses have generally been clear\par -she states that her bowels have been regular\par -her weight has been stable, although she would like to lose weight\par -she denies any headaches, nausea, vomiting, fever, chills, sweats, chest pain, chest pressure, diarrhea, constipation, dysphagia, dizziness, leg swelling, leg pain, itchy eyes, itchy ears, heartburn, reflux, or sour taste in the mouth.

## 2019-10-03 NOTE — PHYSICAL EXAM
[General Appearance - Well Developed] : well developed [Normal Appearance] : normal appearance [General Appearance - Well Nourished] : well nourished [Well Groomed] : well groomed [No Deformities] : no deformities [General Appearance - In No Acute Distress] : no acute distress [Eyelids - No Xanthelasma] : the eyelids demonstrated no xanthelasmas [Normal Conjunctiva] : the conjunctiva exhibited no abnormalities [Normal Oropharynx] : normal oropharynx [II] : II [Neck Appearance] : the appearance of the neck was normal [Neck Cervical Mass (___cm)] : no neck mass was observed [Jugular Venous Distention Increased] : there was no jugular-venous distention [Thyroid Diffuse Enlargement] : the thyroid was not enlarged [Thyroid Nodule] : there were no palpable thyroid nodules [Heart Rate And Rhythm] : heart rate and rhythm were normal [Heart Sounds] : normal S1 and S2 [Murmurs] : no murmurs present [Respiration, Rhythm And Depth] : normal respiratory rhythm and effort [Exaggerated Use Of Accessory Muscles For Inspiration] : no accessory muscle use [Auscultation Breath Sounds / Voice Sounds] : lungs were clear to auscultation bilaterally [Abdomen Soft] : soft [Abdomen Tenderness] : non-tender [Abdomen Mass (___ Cm)] : no abdominal mass palpated [Abnormal Walk] : normal gait [Nail Clubbing] : no clubbing of the fingernails [Gait - Sufficient For Exercise Testing] : the gait was sufficient for exercise testing [Petechial Hemorrhages (___cm)] : no petechial hemorrhages [Cyanosis, Localized] : no localized cyanosis [Skin Color & Pigmentation] : normal skin color and pigmentation [] : no rash [No Venous Stasis] : no venous stasis [Skin Lesions] : no skin lesions [No Skin Ulcers] : no skin ulcer [No Xanthoma] : no  xanthoma was observed [Deep Tendon Reflexes (DTR)] : deep tendon reflexes were 2+ and symmetric [Sensation] : the sensory exam was normal to light touch and pinprick [No Focal Deficits] : no focal deficits [Impaired Insight] : insight and judgment were intact [Oriented To Time, Place, And Person] : oriented to person, place, and time [Affect] : the affect was normal [FreeTextEntry1] : I:E ratio 1:3; clear

## 2019-10-03 NOTE — ASSESSMENT
[FreeTextEntry1] : Ms. Zuniga 63 years old with a history of hypertension, elevated cholesterol, hyperthyroidism, anxiety, prior pneumonia now comes to office for pulmonary re-evaluation. She continues to smoke around 1 ppd - s/p bronchitis (8/2019) - c/w asthma. \par \par Her shortness of breath is multifactorial: \par overweight\par poor mechanics of breathing\par ?cardiac disease (Dr. Sanchez)\par lung reasons--? copd/asthma\par \par problem 1: ? asthma/COPD (due to smoker 30 years) -(s/p asthmatic bronchitis)\par - recommend Methacholine challenge to detect asthma\par - continue Ventolin 2 inhalations q6H\par Asthma is believed to be caused by inherited (genetic) and environmental factor, but its exact cause is\par unknown. Asthma may be triggered by allergens, lung infections, or irritants in the air. Asthma triggers are\par different for each person\par -COPD is a progressive disease and although it can’t be cured , appropriate management can slow its\par progression, reduce frequency and severity of exacerbations, and improve symptoms and the patient\par quality of life. Hospitalizations are the greatest contributor to the total COPD costs and account for up to\par 87% of total COPD related costs. Exacerbations are the main cause of admissions and subsequently\par account for the 40-75% of COPD costs. Inhaled maintenance therapy reduces the incidence of\par exacerbations in patients with stable COPD. Incorrect inhaler use and nonadherence are major obstacles\par to achieving COPD treatment goals. Many COPD patients have challenges (impaired inhalation, limited\par dexterity, reduced cognition: that limit their ability to correctly use their COPD treatment devices resulting\par in reduced symptom control. Of most importance is smoking cessation and early intervention with\par respiratory illnesses and contemplation for pulmonary rehab to enhance quality of life. \par \par problem 2: post nasal drip\par -  recommended Xlear saline solution \par Environmental measures for allergies were encouraged including mattress and pillow cover, air purifier, and environmental controls \par \par problem 3: nicotine addiction\par -Refused Nicotrol inhaler\par - Consider Wellbutrin/ Chantix \par -recommended to see [facility] for assistance \par Discussed for five minutes with the patient the risks/associations with continued smoking including COPD,\par emphysema, shortness of breath, renal cancer, bladder cancer, stroke risk, cardiac disease, etc. Smoking\par cessation was discussed at length and highly encouraged. Various options to aid cessation was\par discussed including use of Chantix, Nicotrol, nicotine products, laser therapy, hypnosis, Wellbutrin, etc\par \par \par problem 4: lung cancer screening/abnormal CT of Chest\par -2 different types of nodules: 1 possibly due to measles and chicken pox and 1 ground glass nodule possibly related to bronchitis or pneumonia\par - S/p chest CT 4/2019; s/p CT 8/2019 (if there are changes a biopsy will be needed) - Next: 8/2020\par \par problem 5: Primary Snoring (SEA NOT Present)\par *Risks: snoring, elevated Mallampati cleft, fatigue\par - S/p home sleep study\par -recommended mouthpiece \par -recommended to use "Oxy Aid" \par - Sleep apnea is associated with adverse clinical consequences which an affect most organ systems.\par Cardiovascular disease risk includes arrhythmias, atrial fibrillation, hypertension, coronary artery disease,\par and stroke. Metabolic disorders include diabetes type 2, non-alcoholic fatty liver disease. Mood disorder\par especially depression; and cognitive decline especially in the elderly. Associations with chronic\par reflux/Montilla’s esophagus some but not all inclusive.\par -Reasons include arousal consistent with hypopnea; respiratory events most prominent in REM sleep or\par supine position; therefore sleep staging and body position are important for accurate diagnosis and\par estimation of AHI.\par Good sleep hygiene was encouraged including avoiding watching television an hour before bed, keeping\par caffeine at a low, avoiding reading, television, or anything, in bed, no drinking any liquids three hours\par before bedtime, and only getting into bed when tired and ready for sleep.\par \par problem 6: overweight\par -Weight loss, exercise, and diet control were discussed and are highly encouraged. Treatment options\par were given such as, aqua therapy, and contacting a nutritionist. Recommended to use the elliptical,\par stationary bike, less use of treadmill. Mindful eating was explained to the patient Obesity is associated\par with worsening asthma, shortness of breath, and potential for cardiac disease, diabetes, and other\par underlying medical conditions. \par \par problem 7: health maintenance\par Problem : health maintenance\par -recommended influenza vaccine\par -recommended strep pneumonia vaccines: Prevnar-13 vaccine, followed by Pneumo vaccine 23 one year\par following\par -recommended early intervention for URIs\par -recommended regular osteoporosis evaluations\par -recommended early dermatological evaluations\par -recommended after the age of 50 to the age of 70, colonoscopy every 5 years\par \par

## 2020-03-11 ENCOUNTER — APPOINTMENT (OUTPATIENT)
Dept: PULMONOLOGY | Facility: CLINIC | Age: 65
End: 2020-03-11
Payer: MEDICARE

## 2020-03-11 VITALS
RESPIRATION RATE: 17 BRPM | WEIGHT: 179 LBS | HEART RATE: 77 BPM | BODY MASS INDEX: 31.71 KG/M2 | OXYGEN SATURATION: 98 % | HEIGHT: 63 IN | SYSTOLIC BLOOD PRESSURE: 110 MMHG | DIASTOLIC BLOOD PRESSURE: 82 MMHG

## 2020-03-11 PROCEDURE — 94729 DIFFUSING CAPACITY: CPT

## 2020-03-11 PROCEDURE — 94010 BREATHING CAPACITY TEST: CPT

## 2020-03-11 PROCEDURE — 99214 OFFICE O/P EST MOD 30 MIN: CPT | Mod: 25

## 2020-03-11 PROCEDURE — ZZZZZ: CPT

## 2020-03-11 NOTE — ASSESSMENT
[FreeTextEntry1] : Ms. Zuniga 63 years old with a history of hypertension, elevated cholesterol, hyperthyroidism, anxiety, prior pneumonia now comes to office for pulmonary re-evaluation. She continues to smoke around 1 ppd - s/p bronchitis (8/2019) - c/w asthma. - improved over all \par \par Her shortness of breath is multifactorial: \par overweight\par poor mechanics of breathing\par ?cardiac disease (Dr. Sanchez)\par lung reasons--? copd/asthma\par \par problem 1: ? asthma/COPD (due to smoker 30 years) -(s/p asthmatic bronchitis)\par - recommend Methacholine challenge to detect asthma\par - continue Ventolin 2 inhalations q6H\par Asthma is believed to be caused by inherited (genetic) and environmental factor, but its exact cause is\par unknown. Asthma may be triggered by allergens, lung infections, or irritants in the air. Asthma triggers are\par different for each person\par -COPD is a progressive disease and although it can’t be cured , appropriate management can slow its\par progression, reduce frequency and severity of exacerbations, and improve symptoms and the patient\par quality of life. Hospitalizations are the greatest contributor to the total COPD costs and account for up to\par 87% of total COPD related costs. Exacerbations are the main cause of admissions and subsequently\par account for the 40-75% of COPD costs. Inhaled maintenance therapy reduces the incidence of\par exacerbations in patients with stable COPD. Incorrect inhaler use and nonadherence are major obstacles\par to achieving COPD treatment goals. Many COPD patients have challenges (impaired inhalation, limited\par dexterity, reduced cognition: that limit their ability to correctly use their COPD treatment devices resulting\par in reduced symptom control. Of most importance is smoking cessation and early intervention with\par respiratory illnesses and contemplation for pulmonary rehab to enhance quality of life. \par \par problem 2: post nasal drip\par -  recommended Xlear saline solution \par Environmental measures for allergies were encouraged including mattress and pillow cover, air purifier, and environmental controls \par \par problem 3: nicotine addiction (3/11/2020)\par -Refused Nicotrol inhaler\par - Wellbutrin - to lontm - 1/2 pack\par -recommended to see [facility] for assistance \par Discussed for five minutes with the patient the risks/associations with continued smoking including COPD,\par emphysema, shortness of breath, renal cancer, bladder cancer, stroke risk, cardiac disease, etc. Smoking\par cessation was discussed at length and highly encouraged. Various options to aid cessation was\par discussed including use of Chantix, Nicotrol, nicotine products, laser therapy, hypnosis, Wellbutrin, etc\par \par \par problem 4: lung cancer screening/abnormal CT of Chest\par -2 different types of nodules: 1 possibly due to measles and chicken pox and 1 ground glass nodule possibly related to bronchitis or pneumonia\par - S/p chest CT 4/2019; s/p CT 8/2019  - Next: 8/2020\par \par problem 5: Primary Snoring (SEA NOT Present)\par *Risks: snoring, elevated Mallampati cleft, fatigue\par - S/p home sleep study\par -recommended mouthpiece \par -recommended to use "Oxy Aid" \par - Sleep apnea is associated with adverse clinical consequences which an affect most organ systems.\par Cardiovascular disease risk includes arrhythmias, atrial fibrillation, hypertension, coronary artery disease,\par and stroke. Metabolic disorders include diabetes type 2, non-alcoholic fatty liver disease. Mood disorder\par especially depression; and cognitive decline especially in the elderly. Associations with chronic\par reflux/Montilla’s esophagus some but not all inclusive.\par -Reasons include arousal consistent with hypopnea; respiratory events most prominent in REM sleep or\par supine position; therefore sleep staging and body position are important for accurate diagnosis and\par estimation of AHI.\par Good sleep hygiene was encouraged including avoiding watching television an hour before bed, keeping\par caffeine at a low, avoiding reading, television, or anything, in bed, no drinking any liquids three hours\par before bedtime, and only getting into bed when tired and ready for sleep.\par \par problem 6: overweight\par -Weight loss, exercise, and diet control were discussed and are highly encouraged. Treatment options\par were given such as, aqua therapy, and contacting a nutritionist. Recommended to use the elliptical,\par stationary bike, less use of treadmill. Mindful eating was explained to the patient Obesity is associated\par with worsening asthma, shortness of breath, and potential for cardiac disease, diabetes, and other\par underlying medical conditions. \par \par problem 7: health maintenance\par Problem : health maintenance\par -recommended influenza vaccine 2019\par -recommended strep pneumonia vaccines: Prevnar-13 vaccine, followed by Pneumo vaccine 23 one year\par following (completed)\par -recommended early intervention for URIs\par -recommended regular osteoporosis evaluations\par -recommended early dermatological evaluations\par -recommended after the age of 50 to the age of 70, colonoscopy every 5 years\par \par

## 2020-03-11 NOTE — HISTORY OF PRESENT ILLNESS
[FreeTextEntry1] : Ms. Zuniga is a 65 year old female with a history of abnormal lung CT, nicotine addiction, PND, poor sleep, and SOB presenting to the office today for a follow up visit. Her chief complaint is\par - she notes she has been generally feeling good \par - she notes she has been sleeping better than she had been\par - She notes  she was having anxiety due to having deaths in the family. \par - She has been exercising and going to the gym. \par - she notes she went to a psychiatrist, she got Welbutrin and it started to kick in, in about a month or two. So now she is smoking half of what she used to. \par -she denies any headaches, nausea, vomiting, fever, chills, sweats, chest pain, chest pressure, diarrhea, constipation, dysphagia, dizziness, sour taste in the mouth, leg swelling, leg pain, itchy eyes, itchy ears, heartburn, reflux, myalgias or arthralgias.\par

## 2020-03-11 NOTE — PROCEDURE
[FreeTextEntry1] : Full PFT revealed normal flows, with a FEV1 of 2.46L, which is 107% of predicted, normal lung volumes, and a diffusion of 20.9, which is 96% of predicted, with a normal flow volume loop\par \par \par \par \par \par \par \par \par

## 2020-03-11 NOTE — ADDENDUM
[FreeTextEntry1] : Documented by Stacia Gorman acting as a scribe for Dr. Sunny Redmond on 03/11/2020.\par \par All medical record entries made by the Scribe were at my, Dr. Sunny Redmond's, direction and personally dictated by me on 03/11/2020. I have reviewed the chart and agree that the record accurately reflects my personal performance of the history, physical exam, assessment and plan. I have also personally directed, reviewed, and agree with the discharge instructions.\par \par \par

## 2020-03-30 ENCOUNTER — TRANSCRIPTION ENCOUNTER (OUTPATIENT)
Age: 65
End: 2020-03-30

## 2020-05-28 ENCOUNTER — RX RENEWAL (OUTPATIENT)
Age: 65
End: 2020-05-28

## 2020-09-16 ENCOUNTER — APPOINTMENT (OUTPATIENT)
Dept: PULMONOLOGY | Facility: CLINIC | Age: 65
End: 2020-09-16
Payer: MEDICARE

## 2020-09-16 VITALS
OXYGEN SATURATION: 97 % | HEART RATE: 77 BPM | HEIGHT: 63 IN | BODY MASS INDEX: 31.71 KG/M2 | RESPIRATION RATE: 16 BRPM | TEMPERATURE: 97.25 F | DIASTOLIC BLOOD PRESSURE: 80 MMHG | WEIGHT: 179 LBS | SYSTOLIC BLOOD PRESSURE: 120 MMHG

## 2020-09-16 DIAGNOSIS — Z23 ENCOUNTER FOR IMMUNIZATION: ICD-10-CM

## 2020-09-16 DIAGNOSIS — J34.89 OTHER SPECIFIED DISORDERS OF NOSE AND NASAL SINUSES: ICD-10-CM

## 2020-09-16 PROCEDURE — 99214 OFFICE O/P EST MOD 30 MIN: CPT | Mod: 25

## 2020-09-16 PROCEDURE — 94618 PULMONARY STRESS TESTING: CPT

## 2020-09-16 PROCEDURE — G0008: CPT

## 2020-09-16 PROCEDURE — 99406 BEHAV CHNG SMOKING 3-10 MIN: CPT

## 2020-09-16 PROCEDURE — 95012 NITRIC OXIDE EXP GAS DETER: CPT

## 2020-09-16 PROCEDURE — 90662 IIV NO PRSV INCREASED AG IM: CPT

## 2020-09-16 RX ORDER — NICOTINE 4 MG/1
10 INHALANT RESPIRATORY (INHALATION)
Qty: 168 | Refills: 5 | Status: DISCONTINUED | COMMUNITY
Start: 2019-01-04 | End: 2020-09-16

## 2020-09-16 RX ORDER — NICOTINE 21 MG/24HR
21 PATCH, TRANSDERMAL 24 HOURS TRANSDERMAL DAILY
Qty: 30 | Refills: 5 | Status: ACTIVE | COMMUNITY
Start: 2020-09-16 | End: 1900-01-01

## 2020-09-16 RX ORDER — VARENICLINE TARTRATE 0.5 (11)-1
0.5 MG X 11 & KIT ORAL
Qty: 1 | Refills: 1 | Status: ACTIVE | COMMUNITY
Start: 2020-09-16 | End: 1900-01-01

## 2020-09-16 NOTE — PHYSICAL EXAM
[General Appearance - Well Developed] : well developed [Normal Appearance] : normal appearance [Well Groomed] : well groomed [No Deformities] : no deformities [General Appearance - Well Nourished] : well nourished [General Appearance - In No Acute Distress] : no acute distress [Normal Conjunctiva] : the conjunctiva exhibited no abnormalities [Eyelids - No Xanthelasma] : the eyelids demonstrated no xanthelasmas [Normal Oropharynx] : normal oropharynx [Neck Cervical Mass (___cm)] : no neck mass was observed [Neck Appearance] : the appearance of the neck was normal [Jugular Venous Distention Increased] : there was no jugular-venous distention [Thyroid Diffuse Enlargement] : the thyroid was not enlarged [Thyroid Nodule] : there were no palpable thyroid nodules [Heart Rate And Rhythm] : heart rate and rhythm were normal [Murmurs] : no murmurs present [Heart Sounds] : normal S1 and S2 [Respiration, Rhythm And Depth] : normal respiratory rhythm and effort [Exaggerated Use Of Accessory Muscles For Inspiration] : no accessory muscle use [Abdomen Soft] : soft [Abdomen Tenderness] : non-tender [Auscultation Breath Sounds / Voice Sounds] : lungs were clear to auscultation bilaterally [Abdomen Mass (___ Cm)] : no abdominal mass palpated [Nail Clubbing] : no clubbing of the fingernails [Abnormal Walk] : normal gait [Gait - Sufficient For Exercise Testing] : the gait was sufficient for exercise testing [Cyanosis, Localized] : no localized cyanosis [Petechial Hemorrhages (___cm)] : no petechial hemorrhages [Skin Color & Pigmentation] : normal skin color and pigmentation [No Venous Stasis] : no venous stasis [Skin Lesions] : no skin lesions [] : no rash [No Skin Ulcers] : no skin ulcer [Sensation] : the sensory exam was normal to light touch and pinprick [No Xanthoma] : no  xanthoma was observed [Deep Tendon Reflexes (DTR)] : deep tendon reflexes were 2+ and symmetric [Oriented To Time, Place, And Person] : oriented to person, place, and time [Impaired Insight] : insight and judgment were intact [No Focal Deficits] : no focal deficits [Affect] : the affect was normal [III] : III [FreeTextEntry1] : I:E 1:3, clear

## 2020-09-16 NOTE — ADDENDUM
[FreeTextEntry1] : Documented by Ray Gallagher acting as a scribe for Dr. Sunny Redmond on 09/16/2020 \par \par All medical record entries made by the Scribe were at my, Dr. Sunny Redmond's, direction and personally dictated by me on 09/16/2020 . I have reviewed the chart and agree that the record accurately reflects my personal performance of the history, physical exam, assessment and plan. I have also personally directed, reviewed, and agree with the discharge instructions.

## 2020-09-16 NOTE — PROCEDURE
[FreeTextEntry1] : CT (SEP.14.2020) reveals stable lung nodules and other pulmonary findings. B/l thyroid gland nodules are noted on prior imaging studies.\par \par 6 minute walk test reveals a low saturation of 97% with no evidence of dyspnea or fatigue; walked 423.8 meters. \par \par FENO was 21; a normal value being less than 25\par Fractional exhaled nitric oxide (FENO) is regarded as a simple, noninvasive method for assessing eosinophilic airway inflammation. Produced by a variety of cells within the lung, nitric oxide (NO) concentrations are generally low in healthy individuals. However, high concentrations of NO appear to be involved in nonspecific host defense mechanisms and chronic inflammatory diseases such as asthma. The American Thor[employer] Society (ATS) therefore has recommended using FENO to aid in the diagnosis and monitoring of eosinophilic airway inflammation and asthma, and for identifying steroid responsive individuals whose chronic respiratory symptoms may be caused by airway inflammation.

## 2020-09-16 NOTE — ASSESSMENT
[FreeTextEntry1] : Ms. Zuniga 65 year old with a history of hypertension, elevated cholesterol, hyperthyroidism, anxiety, prior pneumonia now comes to office for pulmonary re-evaluation. She continues to smoke around 1/2 ppd - s/p bronchitis (8/2019) - c/w asthma (stable) - improved overall - stable \par \par Her shortness of breath is multifactorial: \par overweight\par poor mechanics of breathing\par ?cardiac disease (Dr. Sanchez)\par lung reasons--? copd/asthma\par \par problem 1: ? asthma/COPD (due to smoker 30 years) -(s/p asthmatic bronchitis)\par - recommend Methacholine challenge to detect asthma\par - continue Ventolin 2 inhalations q6H\par Asthma is believed to be caused by inherited (genetic) and environmental factor, but its exact cause is\par unknown. Asthma may be triggered by allergens, lung infections, or irritants in the air. Asthma triggers are\par different for each person\par -COPD is a progressive disease and although it can’t be cured , appropriate management can slow its\par progression, reduce frequency and severity of exacerbations, and improve symptoms and the patient\par quality of life. Hospitalizations are the greatest contributor to the total COPD costs and account for up to\par 87% of total COPD related costs. Exacerbations are the main cause of admissions and subsequently\par account for the 40-75% of COPD costs. Inhaled maintenance therapy reduces the incidence of\par exacerbations in patients with stable COPD. Incorrect inhaler use and nonadherence are major obstacles\par to achieving COPD treatment goals. Many COPD patients have challenges (impaired inhalation, limited\par dexterity, reduced cognition: that limit their ability to correctly use their COPD treatment devices resulting\par in reduced symptom control. Of most importance is smoking cessation and early intervention with\par respiratory illnesses and contemplation for pulmonary rehab to enhance quality of life. \par \par problem 2: post nasal drip\par -  recommended Xlear saline solution \par Environmental measures for allergies were encouraged including mattress and pillow cover, air purifier, and environmental controls \par \par problem 3: nicotine addiction (09.16.2020) \par -Refused Nicotrol inhaler\par -Add Chantix\par - Wellbutrin - to lontm - 1/2 pack\par -recommended to see [facility] for assistance \par Discussed for five minutes with the patient the risks/associations with continued smoking including COPD,\par emphysema, shortness of breath, renal cancer, bladder cancer, stroke risk, cardiac disease, etc. Smoking\par cessation was discussed at length and highly encouraged. Various options to aid cessation was\par discussed including use of Chantix, Nicotrol, nicotine products, laser therapy, hypnosis, Wellbutrin, etc\par \par \par problem 4: lung cancer screening/abnormal CT of Chest\par -2 different types of nodules: 1 possibly due to measles and chicken pox and 1 ground glass nodule possibly related to bronchitis or pneumonia\par - S/p chest CT 4/2019; s/p CT 8/2020 - Next: 8/2021\par \par problem 5: Primary Snoring (SEA NOT Present)\par *Risks: snoring, elevated Mallampati cleft, fatigue\par - S/p home sleep study\par -recommended mouthpiece \par -recommended to use "Oxy Aid" \par - Sleep apnea is associated with adverse clinical consequences which an affect most organ systems.\par Cardiovascular disease risk includes arrhythmias, atrial fibrillation, hypertension, coronary artery disease,\par and stroke. Metabolic disorders include diabetes type 2, non-alcoholic fatty liver disease. Mood disorder\par especially depression; and cognitive decline especially in the elderly. Associations with chronic\par reflux/Montilla’s esophagus some but not all inclusive.\par -Reasons include arousal consistent with hypopnea; respiratory events most prominent in REM sleep or\par supine position; therefore sleep staging and body position are important for accurate diagnosis and\par estimation of AHI.\par Good sleep hygiene was encouraged including avoiding watching television an hour before bed, keeping\par caffeine at a low, avoiding reading, television, or anything, in bed, no drinking any liquids three hours\par before bedtime, and only getting into bed when tired and ready for sleep.\par \par problem 6: overweight\par -Weight loss, exercise, and diet control were discussed and are highly encouraged. Treatment options\par were given such as, aqua therapy, and contacting a nutritionist. Recommended to use the elliptical,\par stationary bike, less use of treadmill. Mindful eating was explained to the patient Obesity is associated\par with worsening asthma, shortness of breath, and potential for cardiac disease, diabetes, and other\par underlying medical conditions. \par \par Problem 8: Health Maintenance/COVID19 Precautions:\par - Clean your hands often. Wash your hands often with soap and water for at least 20 seconds, especially after blowing your nose, coughing, or sneezing, or having been in a public place.\par - If soap and water are not available, use a hand  that contains at least 60% alcohol.\par - To the extent possible, avoid touching high-touch surfaces in public places - elevator buttons, door handles, handrails, handshaking with people, etc. Use a tissue or your sleeve to cover your hand or finger if you must touch something.\par - Wash your hands after touching surfaces in public places.\par - Avoid touching your face, nose, eyes, etc.\par - Clean and disinfect your home to remove germs: practice routine cleaning of frequently touched surfaces (for example: tables, doorknobs, light switches, handles, desks, toilets, faucets, sinks & cell phones)\par - Avoid crowds, especially in poorly ventilated spaces. Your risk of exposure to respiratory viruses like COVID-19 may increase in crowded, closed-in settings with little air circulation if\par there are people in the crowd who are sick. All patients are recommended to practice social distancing and stay at least 6 feet away from others.\par - Avoid all non-essential travel including plane trips, and especially avoid embarking on cruise ships.\par -If COVID-19 is spreading in your community, take extra measures to put distance between yourself and other people to further reduce your risk of being exposed to this new virus.\par -Stay home as much as possible.\par - Consider ways of getting food brought to your house through family, social, or commercial networks\par -Be aware that the virus has been known to live in the air up to 3 hours post exposure, cardboard up to 24 hours post exposure, copper up to 4 hours post exposure, steel and plastic up to 2-3 days post exposure. Risk of transmission from these surfaces are affected by many variables.\par \par Immune Support Recommendations:\par -OTC Vitamin C 500mg BID \par -OTC Quercetin 250-500mg BID \par -OTC Zinc 75-100mg per day \par -OTC Melatonin 1or 2mg a night \par -OTC Vitamin D 1-4000mg per day\par -Tonic Water 8oz\par -Recommended to Stay Hydrated (At least a gallon/day)\par \par Asthma and COVID19:\par You need to make sure your asthma is under control. This often requires the use of inhaled corticosteroids (and sometimes oral corticosteroids). Inhaled corticosteroids do not likely reduce your immune system’s ability to fight infections, but oral corticosteroids may. It is important to use the steps above to protect yourself to limit your exposure to any respiratory virus. \par \par problem 8: health maintenance\par -s/p influenza shot - 9/2020\par -recommended strep pneumonia vaccines: Prevnar-13 vaccine, followed by Pneumo vaccine 23 one year\par following (completed)\par -recommended early intervention for URIs\par -recommended regular osteoporosis evaluations\par -recommended early dermatological evaluations\par -recommended after the age of 50 to the age of 70, colonoscopy every 5 years\par \par

## 2020-09-16 NOTE — HISTORY OF PRESENT ILLNESS
[FreeTextEntry1] : Ms. Zuniga is a 65 year old female with a history of abnormal lung CT, nicotine addiction, PND, poor sleep, and SOB presenting to the office today for a follow up visit. Her chief complaint is\par -she states that she has been feeling great. \par -she states that she sweats all the time but that has been present her entire life. \par -she reports that she does exercise by walking and working out in her at home gym using resistance bands. \par -she notes that she is still smoking; she did cut down to half a pack a day. \par -she reports that she had been taking medication to help her quit smoking. \par -denies SOB. \par -denies wheezing. \par -sinuses are well and clear. \par -she states that she will change Bupropion to Chantix\par -She denies any headaches, nausea, vomiting, fever, chills, chest pain, chest pressure, diarrhea, constipation, dysphagia, dizziness, sour taste in the mouth, leg swelling, leg pain, itchy eyes, itchy ears, heartburn, reflux, myalgias or arthralgias.

## 2020-12-29 DIAGNOSIS — Z01.812 ENCOUNTER FOR PREPROCEDURAL LABORATORY EXAMINATION: ICD-10-CM

## 2021-01-15 ENCOUNTER — APPOINTMENT (OUTPATIENT)
Dept: SURGERY | Facility: CLINIC | Age: 66
End: 2021-01-15
Payer: MEDICARE

## 2021-01-15 ENCOUNTER — LABORATORY RESULT (OUTPATIENT)
Age: 66
End: 2021-01-15

## 2021-01-15 VITALS
HEIGHT: 63 IN | RESPIRATION RATE: 17 BRPM | HEART RATE: 80 BPM | BODY MASS INDEX: 28.35 KG/M2 | OXYGEN SATURATION: 98 % | TEMPERATURE: 98 F | WEIGHT: 160 LBS

## 2021-01-15 DIAGNOSIS — K82.8 OTHER SPECIFIED DISEASES OF GALLBLADDER: ICD-10-CM

## 2021-01-15 DIAGNOSIS — Z87.19 PERSONAL HISTORY OF OTHER DISEASES OF THE DIGESTIVE SYSTEM: ICD-10-CM

## 2021-01-15 DIAGNOSIS — R73.03 PREDIABETES.: ICD-10-CM

## 2021-01-15 DIAGNOSIS — Z80.0 FAMILY HISTORY OF MALIGNANT NEOPLASM OF DIGESTIVE ORGANS: ICD-10-CM

## 2021-01-15 PROCEDURE — 99203 OFFICE O/P NEW LOW 30 MIN: CPT

## 2021-01-15 RX ORDER — FLUTICASONE PROPIONATE 50 UG/1
50 SPRAY, METERED NASAL TWICE DAILY
Qty: 16 | Refills: 0 | Status: DISCONTINUED | COMMUNITY
Start: 2019-08-29 | End: 2021-01-15

## 2021-01-15 RX ORDER — UBIDECARENONE/VIT E ACET 100MG-5
CAPSULE ORAL
Refills: 0 | Status: ACTIVE | COMMUNITY

## 2021-01-15 RX ORDER — DIAZEPAM 2 MG/1
2 TABLET ORAL
Qty: 10 | Refills: 0 | Status: DISCONTINUED | COMMUNITY
Start: 2017-08-08 | End: 2021-01-15

## 2021-01-15 RX ORDER — DEXTROAMPHETAMINE SACCHARATE, AMPHETAMINE ASPARTATE, DEXTROAMPHETAMINE SULFATE, AND AMPHETAMINE SULFATE 3.75; 3.75; 3.75; 3.75 MG/1; MG/1; MG/1; MG/1
TABLET ORAL
Refills: 0 | Status: ACTIVE | COMMUNITY

## 2021-01-15 NOTE — CONSULT LETTER
[Dear  ___] : Dear ~DONNA, [Sincerely,] : Sincerely, [FreeTextEntry2] : Dr. Alban Sanchez [FreeTextEntry1] : At your recommendation I saw Shayla Zuniga in the office on January 15th for reevaluation of gallbladder disease. As you know, 18 years ago she was evaluated for complaints of vague abdominal pain at that time studies showed gallbladder wall thickening consistent with adenomyomatosis no other significant findings. A subsequent CCK HIDA showed an abnormal ejection fraction of 18% and elective cholecystectomy was offered. However the patient chose to continue observation. More recently she has developed frequent, persistent belching in the absence of abdominal pain or other significant symptoms. Repeat imaging with ultrasound, CT and MRI now shows tiny gallstones and, again, gallbladder wall thickening likely consistent with adenomyomatosis but now unable to completely exclude neoplasm.\par \par On exam, the abdomen was soft, nondistended and nontender without palpable mass or organomegaly. No hernias were present and the remainder of the exam was noncontributory.\par \par As I discussed with Ms. Zuniga, it seems most likely that the gallbladder wall abnormality noted on the studies is related to adenomyomatosis and not neoplasm since the radiologic findings seem not to have changed in 18 years except for the fact that gallstones are now demonstrable. However, in view of a reading that states that neoplasm cannot be excluded, cholecystectomy must be performed. I did also caution the patient that cholecystectomy may not alleviate her primary complaint of persistent belching but hopefully she will achieve at least some degree of relief. Once she comes to surgery I will update you on her status and thanks very much for allowing me to, once again, participate in this pleasant woman's care. [FreeTextEntry3] : \par \par Christ Trivedi M.D., F.A.C.S.

## 2021-01-15 NOTE — PHYSICAL EXAM
[Normal Thyroid] : the thyroid was normal [Normal Breath Sounds] : Normal breath sounds [Normal Heart Sounds] : normal heart sounds [Normal Rate and Rhythm] : normal rate and rhythm [No Rash or Lesion] : No rash or lesion [Alert] : alert [Oriented to Person] : oriented to person [Oriented to Place] : oriented to place [Oriented to Time] : oriented to time [Anxious] : anxious [JVD] : no jugular venous distention  [de-identified] : Appears well nourished, in no acute distress [de-identified] : Soft, nontender, nondistended. No palpable mass or organomegaly. No palpable hernias. [de-identified] : WNL [de-identified] : Full ROM

## 2021-01-15 NOTE — ASSESSMENT
[FreeTextEntry1] : 65-year-old female with imaging studies demonstrating gallbladder wall thickening, likely due to adenomyomatosis but unable to completely exclude neoplasm.

## 2021-01-15 NOTE — PLAN
[FreeTextEntry1] : Advised elective laparoscopic cholecystectomy. Discussed with patient the nature of, indications for and risks/benefits of surgery. Also advised that cholecystectomy may not alleviate her complaint of persistent belching.

## 2021-01-15 NOTE — HISTORY OF PRESENT ILLNESS
[de-identified] : The patient is a 65 year old female here for a re-consultation visit. Recent MRI demonstrates likely adenomyomatosis and stones vs. sludge. US 9/20: Gallbladder sludge without evidence of gallstones. Focal masslike thickening of the anterior wall of the gallbladder which is indeterminate. \par In the office today the patient complains of frequent belching. Denies abdominal pain. Denies nausea/vomiting.  [de-identified] : 18 years ago patient was noted to have evidence of adenomyomatosis of the gallbladder on imaging studies. Subsequently underwent CCK DISIDA which showed an abnormal ejection fraction and cholecystectomy was advised but patient chose not to proceed. More recently she has been experiencing frequent persistent belching but no abdominal pain. She does occasionally note substernal discomfort but no other significant symptoms. Reimaging with ultrasound, CT and MRI has demonstrated tiny gallstones and sludge and gallbladder wall thickening likely consistent with adenomyomatosis unable to exclude neoplasm.

## 2021-01-19 ENCOUNTER — APPOINTMENT (OUTPATIENT)
Dept: PULMONOLOGY | Facility: CLINIC | Age: 66
End: 2021-01-19
Payer: MEDICARE

## 2021-01-19 VITALS
TEMPERATURE: 97.1 F | BODY MASS INDEX: 28.7 KG/M2 | DIASTOLIC BLOOD PRESSURE: 70 MMHG | RESPIRATION RATE: 17 BRPM | SYSTOLIC BLOOD PRESSURE: 120 MMHG | HEIGHT: 63 IN | HEART RATE: 80 BPM | WEIGHT: 162 LBS | OXYGEN SATURATION: 98 %

## 2021-01-19 PROCEDURE — 94727 GAS DIL/WSHOT DETER LNG VOL: CPT

## 2021-01-19 PROCEDURE — 99214 OFFICE O/P EST MOD 30 MIN: CPT | Mod: 25

## 2021-01-19 PROCEDURE — 99406 BEHAV CHNG SMOKING 3-10 MIN: CPT | Mod: 25

## 2021-01-19 PROCEDURE — 94618 PULMONARY STRESS TESTING: CPT

## 2021-01-19 PROCEDURE — ZZZZZ: CPT

## 2021-01-19 PROCEDURE — 94010 BREATHING CAPACITY TEST: CPT

## 2021-01-19 PROCEDURE — 94729 DIFFUSING CAPACITY: CPT

## 2021-01-19 PROCEDURE — 95012 NITRIC OXIDE EXP GAS DETER: CPT

## 2021-01-19 NOTE — PROCEDURE
[FreeTextEntry1] : Full PFT revealed normal flows, with a FEV1 of 2.50L, which is 112% of predicted, normal lung volumes, and a normal diffusion of 18.9, which is 94% of predicted, with a normal flow volume loop \par \par FENO was 24; a normal value being less than 25\par Fractional exhaled nitric oxide (FENO) is regarded as a simple, noninvasive method for assessing eosinophilic airway inflammation. Produced by a variety of cells within the lung, nitric oxide (NO) concentrations are generally low in healthy individuals. However, high concentrations of NO appear to be involved in nonspecific host defense mechanisms and chronic inflammatory diseases such as asthma. The American Thoracic Society (ATS) therefore has recommended using FENO to aid in the diagnosis and monitoring of eosinophilic airway inflammation and asthma, and for identifying steroid responsive individuals whose chronic respiratory symptoms may be caused by airway inflammation. \par \par 6 minute walk test reveals a low saturation of 95% with mild dyspnea; walked 472.7 meters

## 2021-01-19 NOTE — ASSESSMENT
[FreeTextEntry1] : Ms. Zuniga 65 year old with a history of hypertension, elevated cholesterol, hyperthyroidism, anxiety, prior pneumonia now comes to office for pulmonary re-evaluation. She continues to smoke around 1/2 ppd - s/p bronchitis (8/2019) - c/w asthma (stable) - but trying to stop smoking / mild dyspnea\par \par *****************PREOP CLEARANCE FOR THYROID BIOPSY AND GB SURGERY*******************\par -at this point in time there are no absolute pulmonary contraindications to go forward with the planned procedure \par -at the time of surgery s/he should have optimal pain control, incentive spirometry, early ambulation, DVT and GI prophylaxis.\par ****************************************************************************************************************\par \par Her shortness of breath is multifactorial: \par overweight\par poor mechanics of breathing\par ?cardiac disease (Dr. Sanchez)\par lung reasons--? copd/asthma\par \par problem 1: ? asthma/COPD (due to smoker 30 years) -(s/p asthmatic bronchitis)\par -Add trial of Trelegy 1 inhalation QD \par - recommend Methacholine challenge to detect asthma\par - continue Ventolin 2 inhalations q6H\par Asthma is believed to be caused by inherited (genetic) and environmental factor, but its exact cause is\par unknown. Asthma may be triggered by allergens, lung infections, or irritants in the air. Asthma triggers are\par different for each person\par -COPD is a progressive disease and although it can’t be cured , appropriate management can slow its\par progression, reduce frequency and severity of exacerbations, and improve symptoms and the patient\par quality of life. Hospitalizations are the greatest contributor to the total COPD costs and account for up to\par 87% of total COPD related costs. Exacerbations are the main cause of admissions and subsequently\par account for the 40-75% of COPD costs. Inhaled maintenance therapy reduces the incidence of\par exacerbations in patients with stable COPD. Incorrect inhaler use and nonadherence are major obstacles\par to achieving COPD treatment goals. Many COPD patients have challenges (impaired inhalation, limited\par dexterity, reduced cognition: that limit their ability to correctly use their COPD treatment devices resulting\par in reduced symptom control. Of most importance is smoking cessation and early intervention with\par respiratory illnesses and contemplation for pulmonary rehab to enhance quality of life. \par \par problem 2: post nasal drip\par -  recommended Xlear saline solution \par Environmental measures for allergies were encouraged including mattress and pillow cover, air purifier, and environmental controls \par \par problem 3: nicotine addiction (01/19/2021) \par -Refused Nicotrol inhaler\par -Add Chantix\par - Wellbutrin - to lontm - 1/2 pack\par -recommended to see [facility] for assistance \par Discussed for five minutes with the patient the risks/associations with continued smoking including COPD,\par emphysema, shortness of breath, renal cancer, bladder cancer, stroke risk, cardiac disease, etc. Smoking\par cessation was discussed at length and highly encouraged. Various options to aid cessation was\par discussed including use of Chantix, Nicotrol, nicotine products, laser therapy, hypnosis, Wellbutrin, etc\par \par \par problem 4: lung cancer screening/abnormal CT of Chest\par -2 different types of nodules: 1 possibly due to measles and chicken pox and 1 ground glass nodule possibly related to bronchitis or pneumonia\par - S/p chest CT 4/2019; s/p CT 8/2020 - Next: 8/2021\par \par problem 5: Primary Snoring (SEA NOT Present)\par *Risks: snoring, elevated Mallampati cleft, fatigue\par - S/p home sleep study\par -recommended mouthpiece \par -recommended to use "Oxy Aid" \par - Sleep apnea is associated with adverse clinical consequences which an affect most organ systems.\par Cardiovascular disease risk includes arrhythmias, atrial fibrillation, hypertension, coronary artery disease,\par and stroke. Metabolic disorders include diabetes type 2, non-alcoholic fatty liver disease. Mood disorder\par especially depression; and cognitive decline especially in the elderly. Associations with chronic\par reflux/Montilla’s esophagus some but not all inclusive.\par -Reasons include arousal consistent with hypopnea; respiratory events most prominent in REM sleep or\par supine position; therefore sleep staging and body position are important for accurate diagnosis and\par estimation of AHI.\par Good sleep hygiene was encouraged including avoiding watching television an hour before bed, keeping\par caffeine at a low, avoiding reading, television, or anything, in bed, no drinking any liquids three hours\par before bedtime, and only getting into bed when tired and ready for sleep.\par \par problem 6: overweight\par -Weight loss, exercise, and diet control were discussed and are highly encouraged. Treatment options\par were given such as, aqua therapy, and contacting a nutritionist. Recommended to use the elliptical,\par stationary bike, less use of treadmill. Mindful eating was explained to the patient Obesity is associated\par with worsening asthma, shortness of breath, and potential for cardiac disease, diabetes, and other\par underlying medical conditions. \par \par Problem 8: Health Maintenance/COVID19 Precautions:\par - Clean your hands often. Wash your hands often with soap and water for at least 20 seconds, especially after blowing your nose, coughing, or sneezing, or having been in a public place.\par - If soap and water are not available, use a hand  that contains at least 60% alcohol.\par - To the extent possible, avoid touching high-touch surfaces in public places - elevator buttons, door handles, handrails, handshaking with people, etc. Use a tissue or your sleeve to cover your hand or finger if you must touch something.\par - Wash your hands after touching surfaces in public places.\par - Avoid touching your face, nose, eyes, etc.\par - Clean and disinfect your home to remove germs: practice routine cleaning of frequently touched surfaces (for example: tables, doorknobs, light switches, handles, desks, toilets, faucets, sinks & cell phones)\par - Avoid crowds, especially in poorly ventilated spaces. Your risk of exposure to respiratory viruses like COVID-19 may increase in crowded, closed-in settings with little air circulation if\par there are people in the crowd who are sick. All patients are recommended to practice social distancing and stay at least 6 feet away from others.\par - Avoid all non-essential travel including plane trips, and especially avoid embarking on cruise ships.\par -If COVID-19 is spreading in your community, take extra measures to put distance between yourself and other people to further reduce your risk of being exposed to this new virus.\par -Stay home as much as possible.\par - Consider ways of getting food brought to your house through family, social, or commercial networks\par -Be aware that the virus has been known to live in the air up to 3 hours post exposure, cardboard up to 24 hours post exposure, copper up to 4 hours post exposure, steel and plastic up to 2-3 days post exposure. Risk of transmission from these surfaces are affected by many variables.\par \par Immune Support Recommendations:\par -OTC Vitamin C 500mg BID \par -OTC Quercetin 250-500mg BID \par -OTC Zinc 75-100mg per day \par -OTC Melatonin 1or 2mg a night \par -OTC Vitamin D 1-4000mg per day\par -Tonic Water 8oz\par -Recommended to Stay Hydrated (At least a gallon/day)\par \par Asthma and COVID19:\par You need to make sure your asthma is under control. This often requires the use of inhaled corticosteroids (and sometimes oral corticosteroids). Inhaled corticosteroids do not likely reduce your immune system’s ability to fight infections, but oral corticosteroids may. It is important to use the steps above to protect yourself to limit your exposure to any respiratory virus. \par \par problem 8: health maintenance\par -s/p influenza shot - 9/2020\par -recommended strep pneumonia vaccines: Prevnar-13 vaccine, followed by Pneumo vaccine 23 one year\par following (completed)\par -recommended early intervention for URIs\par -recommended regular osteoporosis evaluations\par -recommended early dermatological evaluations\par -recommended after the age of 50 to the age of 70, colonoscopy every 5 years\par \par

## 2021-01-19 NOTE — PHYSICAL EXAM

## 2021-01-19 NOTE — HISTORY OF PRESENT ILLNESS
[FreeTextEntry1] : Ms. Zuniga is a 65 year old female with a history of abnormal lung CT, nicotine addiction, PND, poor sleep, and SOB presenting to the office today for a follow up visit. Her chief complaint is SOB\par -she reports feeling generally well\par -she states she has not been exercising as much due to the cold weather. She gained some weight\par -she reports she doesn’t sleep well. She sleeps for a few hours, wakes up and stays awake for a few hours, then sleeps again for a few more hours\par -She notes Her bowels are regular \par -she notes her senses of smell and taste are good \par -she reports having SOB in general, aside from while wearing a mask. She occasionally has to take a side breath\par -she states her sinuses are generally clear. She has to blow her nose now, but didn’t have this issue in the past\par -she has added a few medications to try to help her stop smoking\par -she notes she doesn’t use an inhaler regularly\par -she denies any coughing, wheezing, chest pain, chest pressure, diarrhea, constipation, dysphagia, dizziness, sour taste in the mouth, heartburn, reflux, myalgias or arthralgias.

## 2021-01-19 NOTE — REVIEW OF SYSTEMS
[Negative] : Endocrine [Dyspnea] : dyspnea [SOB on Exertion] : sob on exertion [Nasal Congestion] : no nasal congestion [Postnasal Drip] : no postnasal drip [Sinus Problems] : no sinus problems [Cough] : no cough [Wheezing] : no wheezing [Chest Discomfort] : no chest discomfort [GERD] : no gerd [Diarrhea] : no diarrhea [Constipation] : no constipation [Dysphagia] : no dysphagia [Dizziness] : no dizziness

## 2021-01-19 NOTE — ADDENDUM
[FreeTextEntry1] : Documented by Kali Olivares acting as a scribe for Dr. Sunny Redmond on 01/19/2021.\par \par All medical record entries made by the Scribe were at my, Dr. Sunny Redmond's, direction and personally dictated by me on 01/19/2021. I have reviewed the chart and agree that the record accurately reflects my personal performance of the history, physical exam, assessment and plan. I have also personally directed, reviewed, and agree with the discharge instructions.

## 2021-02-22 ENCOUNTER — OUTPATIENT (OUTPATIENT)
Dept: OUTPATIENT SERVICES | Facility: HOSPITAL | Age: 66
LOS: 1 days | End: 2021-02-22
Payer: MEDICARE

## 2021-02-22 VITALS
OXYGEN SATURATION: 97 % | RESPIRATION RATE: 18 BRPM | HEIGHT: 63 IN | DIASTOLIC BLOOD PRESSURE: 86 MMHG | TEMPERATURE: 98 F | WEIGHT: 186.07 LBS | SYSTOLIC BLOOD PRESSURE: 133 MMHG | HEART RATE: 78 BPM

## 2021-02-22 DIAGNOSIS — Z98.890 OTHER SPECIFIED POSTPROCEDURAL STATES: Chronic | ICD-10-CM

## 2021-02-22 DIAGNOSIS — F17.210 NICOTINE DEPENDENCE, CIGARETTES, UNCOMPLICATED: Chronic | ICD-10-CM

## 2021-02-22 DIAGNOSIS — K82.8 OTHER SPECIFIED DISEASES OF GALLBLADDER: ICD-10-CM

## 2021-02-22 DIAGNOSIS — J44.9 CHRONIC OBSTRUCTIVE PULMONARY DISEASE, UNSPECIFIED: ICD-10-CM

## 2021-02-22 DIAGNOSIS — R91.8 OTHER NONSPECIFIC ABNORMAL FINDING OF LUNG FIELD: Chronic | ICD-10-CM

## 2021-02-22 DIAGNOSIS — I10 ESSENTIAL (PRIMARY) HYPERTENSION: ICD-10-CM

## 2021-02-22 DIAGNOSIS — Z01.818 ENCOUNTER FOR OTHER PREPROCEDURAL EXAMINATION: ICD-10-CM

## 2021-02-22 DIAGNOSIS — Z91.040 LATEX ALLERGY STATUS: ICD-10-CM

## 2021-02-22 DIAGNOSIS — F17.200 NICOTINE DEPENDENCE, UNSPECIFIED, UNCOMPLICATED: ICD-10-CM

## 2021-02-22 LAB
ALBUMIN SERPL ELPH-MCNC: 4.5 G/DL — SIGNIFICANT CHANGE UP (ref 3.3–5)
ALP SERPL-CCNC: 71 U/L — SIGNIFICANT CHANGE UP (ref 40–120)
ALT FLD-CCNC: 24 U/L — SIGNIFICANT CHANGE UP (ref 10–45)
ANION GAP SERPL CALC-SCNC: 13 MMOL/L — SIGNIFICANT CHANGE UP (ref 5–17)
AST SERPL-CCNC: 20 U/L — SIGNIFICANT CHANGE UP (ref 10–40)
BILIRUB SERPL-MCNC: 0.8 MG/DL — SIGNIFICANT CHANGE UP (ref 0.2–1.2)
BUN SERPL-MCNC: 12 MG/DL — SIGNIFICANT CHANGE UP (ref 7–23)
CALCIUM SERPL-MCNC: 9.7 MG/DL — SIGNIFICANT CHANGE UP (ref 8.4–10.5)
CHLORIDE SERPL-SCNC: 102 MMOL/L — SIGNIFICANT CHANGE UP (ref 96–108)
CO2 SERPL-SCNC: 25 MMOL/L — SIGNIFICANT CHANGE UP (ref 22–31)
CREAT SERPL-MCNC: 0.65 MG/DL — SIGNIFICANT CHANGE UP (ref 0.5–1.3)
GLUCOSE SERPL-MCNC: 84 MG/DL — SIGNIFICANT CHANGE UP (ref 70–99)
HCT VFR BLD CALC: 42 % — SIGNIFICANT CHANGE UP (ref 34.5–45)
HGB BLD-MCNC: 13.9 G/DL — SIGNIFICANT CHANGE UP (ref 11.5–15.5)
MCHC RBC-ENTMCNC: 30 PG — SIGNIFICANT CHANGE UP (ref 27–34)
MCHC RBC-ENTMCNC: 33.1 GM/DL — SIGNIFICANT CHANGE UP (ref 32–36)
MCV RBC AUTO: 90.5 FL — SIGNIFICANT CHANGE UP (ref 80–100)
NRBC # BLD: 0 /100 WBCS — SIGNIFICANT CHANGE UP (ref 0–0)
PLATELET # BLD AUTO: 270 K/UL — SIGNIFICANT CHANGE UP (ref 150–400)
POTASSIUM SERPL-MCNC: 3.8 MMOL/L — SIGNIFICANT CHANGE UP (ref 3.5–5.3)
POTASSIUM SERPL-SCNC: 3.8 MMOL/L — SIGNIFICANT CHANGE UP (ref 3.5–5.3)
PROT SERPL-MCNC: 7.1 G/DL — SIGNIFICANT CHANGE UP (ref 6–8.3)
RBC # BLD: 4.64 M/UL — SIGNIFICANT CHANGE UP (ref 3.8–5.2)
RBC # FLD: 13.3 % — SIGNIFICANT CHANGE UP (ref 10.3–14.5)
SODIUM SERPL-SCNC: 140 MMOL/L — SIGNIFICANT CHANGE UP (ref 135–145)
WBC # BLD: 10.03 K/UL — SIGNIFICANT CHANGE UP (ref 3.8–10.5)
WBC # FLD AUTO: 10.03 K/UL — SIGNIFICANT CHANGE UP (ref 3.8–10.5)

## 2021-02-22 PROCEDURE — 85027 COMPLETE CBC AUTOMATED: CPT

## 2021-02-22 PROCEDURE — 80053 COMPREHEN METABOLIC PANEL: CPT

## 2021-02-22 PROCEDURE — 83036 HEMOGLOBIN GLYCOSYLATED A1C: CPT

## 2021-02-22 PROCEDURE — G0463: CPT

## 2021-02-22 RX ORDER — VENLAFAXINE HCL 75 MG
0 CAPSULE, EXT RELEASE 24 HR ORAL
Qty: 0 | Refills: 0 | DISCHARGE

## 2021-02-22 RX ORDER — LIDOCAINE HCL 20 MG/ML
0.2 VIAL (ML) INJECTION ONCE
Refills: 0 | Status: DISCONTINUED | OUTPATIENT
Start: 2021-03-02 | End: 2021-03-16

## 2021-02-22 RX ORDER — ROSUVASTATIN CALCIUM 5 MG/1
0 TABLET ORAL
Qty: 0 | Refills: 0 | DISCHARGE

## 2021-02-22 RX ORDER — SODIUM CHLORIDE 9 MG/ML
3 INJECTION INTRAMUSCULAR; INTRAVENOUS; SUBCUTANEOUS EVERY 8 HOURS
Refills: 0 | Status: DISCONTINUED | OUTPATIENT
Start: 2021-03-02 | End: 2021-03-16

## 2021-02-22 NOTE — H&P PST ADULT - NSICDXPASTSURGICALHX_GEN_ALL_CORE_FT
PAST SURGICAL HISTORY:  Abnormal CT scan, lung follow up with pulmonologist every 4 monthd    Cigarette smoker     H/O laparoscopy      PAST SURGICAL HISTORY:  Abnormal CT scan, lung follow up with pulmonologist every 4 months    Cigarette smoker     H/O laparoscopy

## 2021-02-22 NOTE — H&P PST ADULT - NSICDXPASTMEDICALHX_GEN_ALL_CORE_FT
PAST MEDICAL HISTORY:  Adult ADHD     Chronic obstructive pulmonary disease (COPD)     Depression with anxiety     HLD (hyperlipidemia)     Hypertension     Vitamin D deficiency      PAST MEDICAL HISTORY:  Adult ADHD     Chronic obstructive pulmonary disease (COPD)     Depression with anxiety     HLD (hyperlipidemia)     Hypertension     Prediabetes     Vitamin D deficiency

## 2021-02-22 NOTE — H&P PST ADULT - NSICDXPROBLEM_GEN_ALL_CORE_FT
PROBLEM DIAGNOSES  Problem: Other specified diseases of gallbladder  Assessment and Plan: laparoscopic cholecystectomy    Problem: Latex allergy  Assessment and Plan: notified OR booking    Problem: Chronic obstructive pulmonary disease (COPD)  Assessment and Plan: pulmonary clearance in chart, pt not able to afford inhaler Trelegy, instructed  pt to call pulmonologist    Problem: Hypertension  Assessment and Plan: continue meds    Problem: Nicotine dependence  Assessment and Plan: discussed smoke cessation, pt refused

## 2021-02-22 NOTE — H&P PST ADULT - HISTORY OF PRESENT ILLNESS
This is a 66 y/o female c/o excessive burping, sonogram revealed + gallbladder  disease, she presents today for laparoscopic cholecystectomy 3/2/21  covid swab to be done 2/27 at [facility]  pt denies fever, cough, SOB , loss of taste /smell  or contact anyone with Covid, or travel outside [place] past 30 days This is a 66 y/o female c/o excessive burping, sonogram revealed + gallbladder  disease, she presents today for laparoscopic cholecystectomy 3/2/21  covid swab to be done 2/27 at [facility]  pt denies fever, cough, SOB , loss of taste /smell  or contact anyone with Covid, or travel outside [place] past 30 days.    ********pulmonologist ordered Trelegy inhaler  for pt and insurance not coverage for Trelegy, at present pt not on any med for COPD, able to climb 1 flight stair with mild SOB, instructed pt to call DR Redmond for Trelegy replacement, pt verbalized understanding***

## 2021-02-22 NOTE — H&P PST ADULT - NSANTHOSAYNRD_GEN_A_CORE
No. SEA screening performed.  STOP BANG Legend: 0-2 = LOW Risk; 3-4 = INTERMEDIATE Risk; 5-8 = HIGH Risk

## 2021-02-23 ENCOUNTER — TRANSCRIPTION ENCOUNTER (OUTPATIENT)
Age: 66
End: 2021-02-23

## 2021-02-23 LAB
A1C WITH ESTIMATED AVERAGE GLUCOSE RESULT: 6 % — HIGH (ref 4–5.6)
ESTIMATED AVERAGE GLUCOSE: 126 MG/DL — HIGH (ref 68–114)

## 2021-02-23 RX ORDER — FLUTICASONE FUROATE, UMECLIDINIUM BROMIDE AND VILANTEROL TRIFENATATE 200; 62.5; 25 UG/1; UG/1; UG/1
200-62.5-25 POWDER RESPIRATORY (INHALATION)
Qty: 3 | Refills: 1 | Status: DISCONTINUED | COMMUNITY
Start: 2021-01-19 | End: 2021-02-23

## 2021-02-23 RX ORDER — FLUTICASONE PROPIONATE AND SALMETEROL 250; 50 UG/1; UG/1
250-50 POWDER RESPIRATORY (INHALATION)
Qty: 3 | Refills: 1 | Status: ACTIVE | COMMUNITY
Start: 2021-02-23 | End: 1900-01-01

## 2021-02-27 ENCOUNTER — OUTPATIENT (OUTPATIENT)
Dept: OUTPATIENT SERVICES | Facility: HOSPITAL | Age: 66
LOS: 1 days | End: 2021-02-27
Payer: MEDICARE

## 2021-02-27 DIAGNOSIS — Z98.890 OTHER SPECIFIED POSTPROCEDURAL STATES: Chronic | ICD-10-CM

## 2021-02-27 DIAGNOSIS — Z11.52 ENCOUNTER FOR SCREENING FOR COVID-19: ICD-10-CM

## 2021-02-27 DIAGNOSIS — R91.8 OTHER NONSPECIFIC ABNORMAL FINDING OF LUNG FIELD: Chronic | ICD-10-CM

## 2021-02-27 DIAGNOSIS — F17.210 NICOTINE DEPENDENCE, CIGARETTES, UNCOMPLICATED: Chronic | ICD-10-CM

## 2021-02-27 LAB — SARS-COV-2 RNA SPEC QL NAA+PROBE: SIGNIFICANT CHANGE UP

## 2021-02-27 PROCEDURE — U0005: CPT

## 2021-02-27 PROCEDURE — U0003: CPT

## 2021-02-27 PROCEDURE — C9803: CPT

## 2021-03-01 ENCOUNTER — TRANSCRIPTION ENCOUNTER (OUTPATIENT)
Age: 66
End: 2021-03-01

## 2021-03-02 ENCOUNTER — RESULT REVIEW (OUTPATIENT)
Age: 66
End: 2021-03-02

## 2021-03-02 ENCOUNTER — APPOINTMENT (OUTPATIENT)
Dept: SURGERY | Facility: HOSPITAL | Age: 66
End: 2021-03-02
Payer: MEDICARE

## 2021-03-02 ENCOUNTER — OUTPATIENT (OUTPATIENT)
Dept: OUTPATIENT SERVICES | Facility: HOSPITAL | Age: 66
LOS: 1 days | End: 2021-03-02
Payer: MEDICARE

## 2021-03-02 VITALS
RESPIRATION RATE: 16 BRPM | OXYGEN SATURATION: 97 % | HEART RATE: 61 BPM | TEMPERATURE: 97 F | SYSTOLIC BLOOD PRESSURE: 116 MMHG | HEIGHT: 63 IN | DIASTOLIC BLOOD PRESSURE: 77 MMHG | WEIGHT: 186.07 LBS

## 2021-03-02 VITALS
RESPIRATION RATE: 14 BRPM | SYSTOLIC BLOOD PRESSURE: 104 MMHG | HEART RATE: 75 BPM | TEMPERATURE: 98 F | OXYGEN SATURATION: 98 % | DIASTOLIC BLOOD PRESSURE: 60 MMHG

## 2021-03-02 DIAGNOSIS — R91.8 OTHER NONSPECIFIC ABNORMAL FINDING OF LUNG FIELD: Chronic | ICD-10-CM

## 2021-03-02 DIAGNOSIS — K82.8 OTHER SPECIFIED DISEASES OF GALLBLADDER: ICD-10-CM

## 2021-03-02 DIAGNOSIS — F17.210 NICOTINE DEPENDENCE, CIGARETTES, UNCOMPLICATED: Chronic | ICD-10-CM

## 2021-03-02 DIAGNOSIS — Z01.818 ENCOUNTER FOR OTHER PREPROCEDURAL EXAMINATION: ICD-10-CM

## 2021-03-02 DIAGNOSIS — Z98.890 OTHER SPECIFIED POSTPROCEDURAL STATES: Chronic | ICD-10-CM

## 2021-03-02 PROCEDURE — 47562 LAPAROSCOPIC CHOLECYSTECTOMY: CPT | Mod: AS

## 2021-03-02 PROCEDURE — C9399: CPT

## 2021-03-02 PROCEDURE — 47562 LAPAROSCOPIC CHOLECYSTECTOMY: CPT

## 2021-03-02 PROCEDURE — 88304 TISSUE EXAM BY PATHOLOGIST: CPT | Mod: 26

## 2021-03-02 PROCEDURE — 88304 TISSUE EXAM BY PATHOLOGIST: CPT

## 2021-03-02 PROCEDURE — C1889: CPT

## 2021-03-02 RX ORDER — OXYCODONE HYDROCHLORIDE 5 MG/1
5 TABLET ORAL ONCE
Refills: 0 | Status: DISCONTINUED | OUTPATIENT
Start: 2021-03-02 | End: 2021-03-02

## 2021-03-02 RX ORDER — ALPRAZOLAM 0.25 MG
1 TABLET ORAL
Qty: 0 | Refills: 0 | DISCHARGE

## 2021-03-02 RX ORDER — CEFAZOLIN SODIUM 1 G
2000 VIAL (EA) INJECTION ONCE
Refills: 0 | Status: COMPLETED | OUTPATIENT
Start: 2021-03-02 | End: 2021-03-02

## 2021-03-02 RX ORDER — CHOLECALCIFEROL (VITAMIN D3) 125 MCG
0 CAPSULE ORAL
Qty: 0 | Refills: 0 | DISCHARGE

## 2021-03-02 RX ORDER — VENLAFAXINE HCL 75 MG
1 CAPSULE, EXT RELEASE 24 HR ORAL
Qty: 0 | Refills: 0 | DISCHARGE

## 2021-03-02 RX ORDER — METOPROLOL TARTRATE 50 MG
1 TABLET ORAL
Qty: 0 | Refills: 0 | DISCHARGE

## 2021-03-02 RX ORDER — ONDANSETRON 8 MG/1
4 TABLET, FILM COATED ORAL ONCE
Refills: 0 | Status: DISCONTINUED | OUTPATIENT
Start: 2021-03-02 | End: 2021-03-16

## 2021-03-02 RX ORDER — ROSUVASTATIN CALCIUM 5 MG/1
1 TABLET ORAL
Qty: 0 | Refills: 0 | DISCHARGE

## 2021-03-02 RX ORDER — SODIUM CHLORIDE 9 MG/ML
1000 INJECTION, SOLUTION INTRAVENOUS
Refills: 0 | Status: DISCONTINUED | OUTPATIENT
Start: 2021-03-02 | End: 2021-03-16

## 2021-03-02 RX ORDER — ASPIRIN/CALCIUM CARB/MAGNESIUM 324 MG
0 TABLET ORAL
Qty: 0 | Refills: 0 | DISCHARGE

## 2021-03-02 RX ORDER — OXYCODONE HYDROCHLORIDE 5 MG/1
10 TABLET ORAL ONCE
Refills: 0 | Status: DISCONTINUED | OUTPATIENT
Start: 2021-03-02 | End: 2021-03-02

## 2021-03-02 RX ORDER — FENTANYL CITRATE 50 UG/ML
25 INJECTION INTRAVENOUS
Refills: 0 | Status: DISCONTINUED | OUTPATIENT
Start: 2021-03-02 | End: 2021-03-02

## 2021-03-02 RX ORDER — DEXTROAMPHETAMINE SACCHARATE, AMPHETAMINE ASPARTATE, DEXTROAMPHETAMINE SULFATE AND AMPHETAMINE SULFATE 1.875; 1.875; 1.875; 1.875 MG/1; MG/1; MG/1; MG/1
1 TABLET ORAL
Qty: 0 | Refills: 0 | DISCHARGE

## 2021-03-02 NOTE — ASU DISCHARGE PLAN (ADULT/PEDIATRIC) - ASU DC SPECIAL INSTRUCTIONSFT
Please utilize Tylenol or Motrin prior to using percocet.  Do not take more than 4000mg of Tylenol in a 24 hour period.  percocet contains tylenol be sure not to tylenol and percocet at the same time Please utilize Tylenol or Motrin prior to using percocet.  Do not take more than 4000mg of Tylenol in a 24 hour period.  percocet contains tylenol be sure not to tylenol and percocet at the same time    may shower in 24 hours - pat little white bandaids called steristrips dry after showering, they will fall off on own in 1-2 weeks

## 2021-03-02 NOTE — ASU DISCHARGE PLAN (ADULT/PEDIATRIC) - CARE PROVIDER_API CALL
Christ Trivedi)  Surgery  [address], [address]  [place], [place] [id number]  Phone: [phone]  Fax: [phone]  Follow Up Time: 2 weeks

## 2021-03-02 NOTE — ASU PATIENT PROFILE, ADULT - PSH
Abnormal CT scan, lung  follow up with pulmonologist every 4 months  Cigarette smoker    H/O laparoscopy

## 2021-03-02 NOTE — ASU PATIENT PROFILE, ADULT - PMH
Adult ADHD    Chronic obstructive pulmonary disease (COPD)    Depression with anxiety    HLD (hyperlipidemia)    Hypertension    Prediabetes    Vitamin D deficiency

## 2021-03-02 NOTE — ASU DISCHARGE PLAN (ADULT/PEDIATRIC) - NURSING INSTRUCTIONS
Next dose of Tylenol will be on or after __0309PM_________ ,today/tonight and every 6 hours afterwards for pain management, do not take any Tylenol containing products until this time. Your first dose of Tylenol was given at __0909AM_________. Do not exceed more than 4000mg of Tylenol in one 24 hour setting.

## 2021-03-05 PROBLEM — I10 ESSENTIAL (PRIMARY) HYPERTENSION: Chronic | Status: ACTIVE | Noted: 2021-02-22

## 2021-03-05 PROBLEM — F90.9 ATTENTION-DEFICIT HYPERACTIVITY DISORDER, UNSPECIFIED TYPE: Chronic | Status: ACTIVE | Noted: 2021-02-22

## 2021-03-05 PROBLEM — R73.03 PREDIABETES: Chronic | Status: ACTIVE | Noted: 2021-02-22

## 2021-03-05 PROBLEM — J44.9 CHRONIC OBSTRUCTIVE PULMONARY DISEASE, UNSPECIFIED: Chronic | Status: ACTIVE | Noted: 2021-02-22

## 2021-03-05 PROBLEM — F41.8 OTHER SPECIFIED ANXIETY DISORDERS: Chronic | Status: ACTIVE | Noted: 2021-02-22

## 2021-03-05 PROBLEM — E55.9 VITAMIN D DEFICIENCY, UNSPECIFIED: Chronic | Status: ACTIVE | Noted: 2021-02-22

## 2021-03-08 LAB — SURGICAL PATHOLOGY STUDY: SIGNIFICANT CHANGE UP

## 2021-03-16 PROBLEM — Z09 POSTOPERATIVE EXAMINATION: Status: ACTIVE | Noted: 2021-03-16

## 2021-03-17 ENCOUNTER — APPOINTMENT (OUTPATIENT)
Dept: SURGERY | Facility: CLINIC | Age: 66
End: 2021-03-17
Payer: MEDICARE

## 2021-03-17 VITALS
HEART RATE: 69 BPM | RESPIRATION RATE: 16 BRPM | SYSTOLIC BLOOD PRESSURE: 148 MMHG | DIASTOLIC BLOOD PRESSURE: 88 MMHG | OXYGEN SATURATION: 99 % | TEMPERATURE: 97.2 F

## 2021-03-17 DIAGNOSIS — K81.1 CHRONIC CHOLECYSTITIS: ICD-10-CM

## 2021-03-17 DIAGNOSIS — Z09 ENCOUNTER FOR FOLLOW-UP EXAMINATION AFTER COMPLETED TREATMENT FOR CONDITIONS OTHER THAN MALIGNANT NEOPLASM: ICD-10-CM

## 2021-03-17 PROCEDURE — 99024 POSTOP FOLLOW-UP VISIT: CPT

## 2021-03-17 RX ORDER — OXYCODONE AND ACETAMINOPHEN 5; 325 MG/1; MG/1
5-325 TABLET ORAL
Qty: 6 | Refills: 0 | Status: DISCONTINUED | COMMUNITY
Start: 2021-03-02 | End: 2021-03-17

## 2021-03-17 NOTE — CONSULT LETTER
[Dear  ___] : Dear ~DONNA, [Sincerely,] : Sincerely, [FreeTextEntry2] : Dr. Alban Sanchez [FreeTextEntry1] : I saw Shayla Zuniga back in the office for a postop check on March 17th. Since undergoing her laparoscopic cholecystectomy on the third, Ms. Zuniga has done well. At today's visit she stated that she was almost entirely pain free and tolerating her diet without difficulty.\par \par On exam the abdomen was soft, nondistended and nontender and the trocar sites were all noted to be healing well.\par \par As I discussed with Ms. Zuniga, the pathology report showed only evidence of chronic cholecystitis and no tumor of any sort. But perhaps the most beneficial outcome of her procedure may be the fact that she has not smoked since prior to the surgery. I strongly encouraged her to continue her commitment to avoiding tobacco and I suggested that she may gradually liberalize her physical activities as she feels able. I have otherwise discharge her from any further postoperative followup. Thanks once again for allowing me to participate in this pleasant woman's care. [FreeTextEntry3] : \par \par Christ Trivedi M.D., F.A.C.S. [DrOscar  ___] : Dr. JOYCE

## 2021-03-17 NOTE — HISTORY OF PRESENT ILLNESS
[de-identified] : Shayla is a 67 y/o female here for post-operative visit. 3/2/21- laparoscopic cholecystectomy.  [de-identified] : Status post laparoscopic cholecystectomy on 3/2/21. At present has minimal residual discomfort at the trocar sites. Tolerating diet well and having regular bowel activity.

## 2021-04-20 ENCOUNTER — APPOINTMENT (OUTPATIENT)
Dept: PULMONOLOGY | Facility: CLINIC | Age: 66
End: 2021-04-20
Payer: MEDICARE

## 2021-04-20 ENCOUNTER — NON-APPOINTMENT (OUTPATIENT)
Age: 66
End: 2021-04-20

## 2021-04-20 VITALS
HEIGHT: 62 IN | BODY MASS INDEX: 34.96 KG/M2 | SYSTOLIC BLOOD PRESSURE: 120 MMHG | RESPIRATION RATE: 16 BRPM | WEIGHT: 190 LBS | DIASTOLIC BLOOD PRESSURE: 80 MMHG | TEMPERATURE: 97.4 F | OXYGEN SATURATION: 98 % | HEART RATE: 87 BPM

## 2021-04-20 PROCEDURE — 95012 NITRIC OXIDE EXP GAS DETER: CPT

## 2021-04-20 PROCEDURE — 99214 OFFICE O/P EST MOD 30 MIN: CPT | Mod: 25

## 2021-04-20 PROCEDURE — 94010 BREATHING CAPACITY TEST: CPT

## 2021-04-20 RX ORDER — ROSUVASTATIN CALCIUM 40 MG/1
40 TABLET, FILM COATED ORAL
Qty: 90 | Refills: 0 | Status: ACTIVE | COMMUNITY
Start: 2020-06-27

## 2021-04-20 RX ORDER — METOPROLOL SUCCINATE 25 MG/1
25 TABLET, EXTENDED RELEASE ORAL
Qty: 84 | Refills: 0 | Status: ACTIVE | COMMUNITY
Start: 2020-05-28

## 2021-04-20 RX ORDER — FLUTICASONE PROPIONATE AND SALMETEROL 250; 50 UG/1; UG/1
250-50 POWDER RESPIRATORY (INHALATION)
Qty: 3 | Refills: 1 | Status: ACTIVE | COMMUNITY
Start: 2021-04-20 | End: 1900-01-01

## 2021-04-20 NOTE — HISTORY OF PRESENT ILLNESS
[FreeTextEntry1] : Ms. Zuniga is a 66 year old female with a history of abnormal lung CT, nicotine addiction, PND, poor sleep, and SOB presenting to the office today for a follow up visit. Her chief complaint is \par -she reports she is s/p a GB surgery (nodules) with no complications. It hadn't been bothering her before her surgery, and she feels well after her surgery\par -she states she hasn't been exercising regularly\par -she reports she stopped smoking for her surgery by stopping cold turkey, and is managing her anxiety in other ways\par -she states her senses of smell and taste are better, and her SOB has improved\par -she reports she is s/p the Pfizer vaccine on 3/9/21\par -she reports her sleep has improved\par -she reports she didn't  her inhaler due to insurance issues\par -she denies any chest pain, chest pressure, diarrhea, constipation, dysphagia, dizziness, sour taste in the mouth, leg swelling, leg pain, itchy eyes, itchy ears, heartburn, reflux, myalgias or arthralgias.

## 2021-04-20 NOTE — ASSESSMENT
[FreeTextEntry1] : Ms. Zuniga 66 year old with a history of hypertension, elevated cholesterol, hyperthyroidism, anxiety, prior pneumonia now comes to office for pulmonary re-evaluation. She continues to smoke around 1/2 ppd - s/p bronchitis (8/2019) - c/w asthma (stable) - but trying to stop smoking / mild dyspnea - improved with smoking cessation\par \par Her shortness of breath is multifactorial: \par overweight\par poor mechanics of breathing\par ?cardiac disease (Dr. Sanchez)\par lung reasons--? copd/asthma\par \par problem 1: ? asthma/COPD (due to smoker 30 years) -(s/p asthmatic bronchitis)\par -Add generic Advair (250) 1 inhalation BID \par - recommend Methacholine challenge to detect asthma\par - continue Ventolin 2 inhalations q6H\par Asthma is believed to be caused by inherited (genetic) and environmental factor, but its exact cause is\par unknown. Asthma may be triggered by allergens, lung infections, or irritants in the air. Asthma triggers are\par different for each person\par -COPD is a progressive disease and although it can’t be cured , appropriate management can slow its\par progression, reduce frequency and severity of exacerbations, and improve symptoms and the patient\par quality of life. Hospitalizations are the greatest contributor to the total COPD costs and account for up to\par 87% of total COPD related costs. Exacerbations are the main cause of admissions and subsequently\par account for the 40-75% of COPD costs. Inhaled maintenance therapy reduces the incidence of\par exacerbations in patients with stable COPD. Incorrect inhaler use and nonadherence are major obstacles\par to achieving COPD treatment goals. Many COPD patients have challenges (impaired inhalation, limited\par dexterity, reduced cognition: that limit their ability to correctly use their COPD treatment devices resulting\par in reduced symptom control. Of most importance is smoking cessation and early intervention with\par respiratory illnesses and contemplation for pulmonary rehab to enhance quality of life. \par \par problem 2: post nasal drip\par -  recommended Xlear saline solution \par Environmental measures for allergies were encouraged including mattress and pillow cover, air purifier, and environmental controls \par \par problem 3: nicotine addiction (01/19/2021)  - completed / resolved 04/20/2021 \par -Refused Nicotrol inhaler\par -recommended to see [facility] for assistance \par Discussed for five minutes with the patient the risks/associations with continued smoking including COPD,\par emphysema, shortness of breath, renal cancer, bladder cancer, stroke risk, cardiac disease, etc. Smoking\par cessation was discussed at length and highly encouraged. Various options to aid cessation was\par discussed including use of Chantix, Nicotrol, nicotine products, laser therapy, hypnosis, Wellbutrin, etc\par \par \par problem 4: lung cancer screening/abnormal CT of Chest\par -2 different types of nodules: 1 possibly due to measles and chicken pox and 1 ground glass nodule possibly related to bronchitis or pneumonia\par - S/p chest CT 4/2019; s/p CT 8/2020 - Next: 8/2021\par \par problem 5: Primary Snoring (SEA NOT Present)\par *Risks: snoring, elevated Mallampati cleft, fatigue\par - S/p home sleep study\par -recommended mouthpiece \par -recommended to use "Oxy Aid" \par - Sleep apnea is associated with adverse clinical consequences which an affect most organ systems.\par Cardiovascular disease risk includes arrhythmias, atrial fibrillation, hypertension, coronary artery disease,\par and stroke. Metabolic disorders include diabetes type 2, non-alcoholic fatty liver disease. Mood disorder\par especially depression; and cognitive decline especially in the elderly. Associations with chronic\par reflux/Montilla’s esophagus some but not all inclusive.\par -Reasons include arousal consistent with hypopnea; respiratory events most prominent in REM sleep or\par supine position; therefore sleep staging and body position are important for accurate diagnosis and\par estimation of AHI.\par Good sleep hygiene was encouraged including avoiding watching television an hour before bed, keeping\par caffeine at a low, avoiding reading, television, or anything, in bed, no drinking any liquids three hours\par before bedtime, and only getting into bed when tired and ready for sleep.\par \par problem 6: overweight\par -Weight loss, exercise, and diet control were discussed and are highly encouraged. Treatment options\par were given such as, aqua therapy, and contacting a nutritionist. Recommended to use the elliptical,\par stationary bike, less use of treadmill. Mindful eating was explained to the patient Obesity is associated\par with worsening asthma, shortness of breath, and potential for cardiac disease, diabetes, and other\par underlying medical conditions. \par \par Problem 8: Health Maintenance/COVID19 Precautions:\par -s/p Covid-19 vaccine\par \par - Clean your hands often. Wash your hands often with soap and water for at least 20 seconds, especially after blowing your nose, coughing, or sneezing, or having been in a public place.\par - If soap and water are not available, use a hand  that contains at least 60% alcohol.\par - To the extent possible, avoid touching high-touch surfaces in public places - elevator buttons, door handles, handrails, handshaking with people, etc. Use a tissue or your sleeve to cover your hand or finger if you must touch something.\par - Wash your hands after touching surfaces in public places.\par - Avoid touching your face, nose, eyes, etc.\par - Clean and disinfect your home to remove germs: practice routine cleaning of frequently touched surfaces (for example: tables, doorknobs, light switches, handles, desks, toilets, faucets, sinks & cell phones)\par - Avoid crowds, especially in poorly ventilated spaces. Your risk of exposure to respiratory viruses like COVID-19 may increase in crowded, closed-in settings with little air circulation if\par there are people in the crowd who are sick. All patients are recommended to practice social distancing and stay at least 6 feet away from others.\par - Avoid all non-essential travel including plane trips, and especially avoid embarking on cruise ships.\par -If COVID-19 is spreading in your community, take extra measures to put distance between yourself and other people to further reduce your risk of being exposed to this new virus.\par -Stay home as much as possible.\par - Consider ways of getting food brought to your house through family, social, or commercial networks\par -Be aware that the virus has been known to live in the air up to 3 hours post exposure, cardboard up to 24 hours post exposure, copper up to 4 hours post exposure, steel and plastic up to 2-3 days post exposure. Risk of transmission from these surfaces are affected by many variables.\par \par Immune Support Recommendations:\par -OTC Vitamin C 500mg BID \par -OTC Quercetin 250-500mg BID \par -OTC Zinc 75-100mg per day \par -OTC Melatonin 1or 2mg a night \par -OTC Vitamin D 1-4000mg per day\par -Tonic Water 8oz\par -Recommended to Stay Hydrated (At least a gallon/day)\par \par Asthma and COVID19:\par You need to make sure your asthma is under control. This often requires the use of inhaled corticosteroids (and sometimes oral corticosteroids). Inhaled corticosteroids do not likely reduce your immune system’s ability to fight infections, but oral corticosteroids may. It is important to use the steps above to protect yourself to limit your exposure to any respiratory virus. \par \par problem 8: health maintenance\par -s/p influenza shot - 9/2020\par -recommended strep pneumonia vaccines: Prevnar-13 vaccine, followed by Pneumo vaccine 23 one year\par following (completed)\par -recommended early intervention for URIs\par -recommended regular osteoporosis evaluations\par -recommended early dermatological evaluations\par -recommended after the age of 50 to the age of 70, colonoscopy every 5 years\par \par

## 2021-04-20 NOTE — ADDENDUM
[FreeTextEntry1] : Documented by Kali Olivares acting as a scribe for Dr. Sunny Redmond on 04/20/2021.\par \par All medical record entries made by the Scribe were at my, Dr. Sunny Redmond's, direction and personally dictated by me on 04/20/2021. I have reviewed the chart and agree that the record accurately reflects my personal performance of the history, physical exam, assessment and plan. I have also personally directed, reviewed, and agree with the discharge instructions.

## 2021-04-20 NOTE — REVIEW OF SYSTEMS
[Negative] : Endocrine [Dyspnea] : no dyspnea [SOB on Exertion] : no sob on exertion [Chest Discomfort] : no chest discomfort [GERD] : no gerd [Constipation] : no constipation [Diarrhea] : no diarrhea [Dysphagia] : no dysphagia

## 2021-04-20 NOTE — PROCEDURE
[FreeTextEntry1] : PFT revealed normal flows, with a FEV1 of 2.49L, which is 115% of predicted, with a normal flow volume loop\par \par FENO was 37; a normal value being less than 25\par Fractional exhaled nitric oxide (FENO) is regarded as a simple, noninvasive method for assessing eosinophilic airway inflammation. Produced by a variety of cells within the lung, nitric oxide (NO) concentrations are generally low in healthy individuals. However, high concentrations of NO appear to be involved in nonspecific host defense mechanisms and chronic inflammatory diseases such as asthma. The American Thoracic Society (ATS) therefore has recommended using FENO to aid in the diagnosis and monitoring of eosinophilic airway inflammation and asthma, and for identifying steroid responsive individuals whose chronic respiratory symptoms may be caused by airway inflammation.

## 2021-04-20 NOTE — PHYSICAL EXAM

## 2021-05-26 NOTE — ED PROVIDER NOTE - PEDAL EDEMA LATERALITY
Spoke to Sarah at Pre-admission and she was asking about plavix hold for patient. Informed her that per Dr. Greene's note on 04/27/21 at 4:03 pm, patient could hold Plavix 7 days prior to procedure.   none

## 2021-06-03 ENCOUNTER — APPOINTMENT (OUTPATIENT)
Dept: ORTHOPEDIC SURGERY | Facility: CLINIC | Age: 66
End: 2021-06-03
Payer: MEDICARE

## 2021-06-03 VITALS
WEIGHT: 180 LBS | HEART RATE: 78 BPM | HEIGHT: 62 IN | BODY MASS INDEX: 33.13 KG/M2 | SYSTOLIC BLOOD PRESSURE: 144 MMHG | DIASTOLIC BLOOD PRESSURE: 87 MMHG

## 2021-06-03 DIAGNOSIS — Z86.39 PERSONAL HISTORY OF OTHER ENDOCRINE, NUTRITIONAL AND METABOLIC DISEASE: ICD-10-CM

## 2021-06-03 DIAGNOSIS — Z86.79 PERSONAL HISTORY OF OTHER DISEASES OF THE CIRCULATORY SYSTEM: ICD-10-CM

## 2021-06-03 DIAGNOSIS — F41.1 GENERALIZED ANXIETY DISORDER: ICD-10-CM

## 2021-06-03 DIAGNOSIS — Z87.09 PERSONAL HISTORY OF OTHER DISEASES OF THE RESPIRATORY SYSTEM: ICD-10-CM

## 2021-06-03 DIAGNOSIS — Z87.01 PERSONAL HISTORY OF PNEUMONIA (RECURRENT): ICD-10-CM

## 2021-06-03 PROCEDURE — 99204 OFFICE O/P NEW MOD 45 MIN: CPT

## 2021-06-03 PROCEDURE — 72100 X-RAY EXAM L-S SPINE 2/3 VWS: CPT

## 2021-06-03 RX ORDER — METHYLPREDNISOLONE 4 MG/1
4 TABLET ORAL
Qty: 21 | Refills: 0 | Status: ACTIVE | COMMUNITY
Start: 2021-06-03 | End: 1900-01-01

## 2021-06-03 RX ORDER — DICLOFENAC SODIUM 75 MG/1
75 TABLET, DELAYED RELEASE ORAL
Qty: 60 | Refills: 0 | Status: ACTIVE | COMMUNITY
Start: 2021-06-03 | End: 1900-01-01

## 2021-06-07 PROBLEM — Z87.01 HISTORY OF COMMUNITY ACQUIRED PNEUMONIA: Status: RESOLVED | Noted: 2021-06-07 | Resolved: 2021-06-07

## 2021-06-07 PROBLEM — Z86.39 HISTORY OF HYPERLIPIDEMIA: Status: RESOLVED | Noted: 2021-01-15 | Resolved: 2021-06-07

## 2021-06-07 PROBLEM — Z87.09 HISTORY OF ASTHMA: Status: RESOLVED | Noted: 2021-06-07 | Resolved: 2021-06-07

## 2021-06-07 PROBLEM — Z86.79 HISTORY OF ESSENTIAL HYPERTENSION: Status: RESOLVED | Noted: 2021-06-07 | Resolved: 2021-06-07

## 2021-06-07 PROBLEM — F41.1 GENERALIZED ANXIETY DISORDER: Status: RESOLVED | Noted: 2021-06-07 | Resolved: 2021-06-07

## 2021-06-07 NOTE — REASON FOR VISIT
[Initial Visit] : an initial visit for [Herniated Discs] : herniated discs [Back Pain] : back pain [Radiculopathy] : radiculopathy

## 2021-06-07 NOTE — REVIEW OF SYSTEMS
[Wheezing] : wheezing [Negative] : Genitourinary [FreeTextEntry5] : Hyperlipidemia [FreeTextEntry6] : History of asthma

## 2021-06-07 NOTE — HISTORY OF PRESENT ILLNESS
[de-identified] : 66-year-old woman had her first episode of back pain 20 years ago and she has had 3 or 4 episodes since then in the past she had back pain only without associated leg pain and was treated only with rest.  She has had a few months of intermittent numbness and tingling to her right foot and 2 weeks ago had the spontaneous onset of lower back pain.  She saw her primary care physician who treated her with a Medrol Dosepak and the back pain that was initially graded as a 10 became an ache.  She then did some planting in the garden, food shopping and went to the [facility] and the pain became significantly worse again and was graded as a 10 and at this point had pain in the right buttock and some radiation of the pain to the right anterolateral thigh and knee that is described as burning and also graded as a 10.  She has not had left leg pain.  She has night pain.  There is no Valsalva effect.  She had been taking ibuprofen 800 mg 4 or 5 times a day, Xanax and CBD.\par \par She is on medication for anxiety and ADD.  She has hypertension and hyperlipidemia.  She recently underwent a cholecystectomy in March and has just stopped smoking 3 months ago.  She is allergic to erythromycin.

## 2021-06-07 NOTE — DISCUSSION/SUMMARY
[Medication Risks Reviewed] : Medication risks reviewed [de-identified] : Symptoms are consistent with a herniated lumbar disc the burning nature of the pain suggests the possible of some ganglionic irritation from a foraminal or lateral disc herniation.  She will rest and use moist heat.  She will repeat the Medrol Dosepak and then progressed to diclofenac 75 mg twice a day.  She can supplement that with extra strength Tylenol.  She will discontinue the ibuprofen.  She will call if there are problems with the medication or worsening of her symptoms and I will see her for follow-up in 3 weeks.  Did discuss that this is a problem that could progress to the point where surgical management is necessary but 80 to 90% of patients will get better with nonsurgical management.

## 2021-06-07 NOTE — PHYSICAL EXAM
[de-identified] : She is fully alert and oriented with a normal mood and affect.  She is in no acute distress as I take the history.  She ambulates with a normal gait including tiptoe and heel walking.  There is no paravertebral muscle spasm.  There is a trace of sciatic notch sensitivity on the right but none on the left.  There is no trochanteric tenderness.  There were no cutaneous abnormalities or palpable bony defects of the spine.  There is no evidence of shortness of breath or respiratory distress.  Forward flexion of the spine is limited to 70 degrees by back and leg pain.  Her lower extremity neurological examination revealed 1-2+ symmetrical reflexes.  Motor power is normal to manual testing in all lower extremity groups and sensation is normal to light touch in all dermatomes.  Straight leg raising is negative to 90 degrees in the sitting position.  Her hips and her knees have a full and painless range of motion with normal stability.  Vascular examination shows no evidence of significant varicosities and there is no lymphedema.  There are no cutaneous abnormalities of the upper extremities or of the lower extremities.  Her upper extremities are normal to inspection and her elbows have a full and painless range of motion with normal stability. [de-identified] : AP and lateral x-rays of the lumbar spine reveal mild scoliosis.  Sagittal alignment is normal and there are no destructive changes.

## 2021-06-10 ENCOUNTER — TRANSCRIPTION ENCOUNTER (OUTPATIENT)
Age: 66
End: 2021-06-10

## 2021-06-24 ENCOUNTER — APPOINTMENT (OUTPATIENT)
Dept: MRI IMAGING | Facility: CLINIC | Age: 66
End: 2021-06-24

## 2021-06-24 ENCOUNTER — APPOINTMENT (OUTPATIENT)
Dept: ORTHOPEDIC SURGERY | Facility: CLINIC | Age: 66
End: 2021-06-24
Payer: MEDICARE

## 2021-06-24 PROCEDURE — 99214 OFFICE O/P EST MOD 30 MIN: CPT

## 2021-06-24 NOTE — HISTORY OF PRESENT ILLNESS
[de-identified] : She did not have problems tolerating the Medrol Dosepak nor is she having problems with the diclofenac.  The back pain that has been graded as a 10 is currently a 6.  She has not seen any relief in the right leg pain. [Pain Location] : pain [Improving] : improving

## 2021-06-24 NOTE — DISCUSSION/SUMMARY
[Medication Risks Reviewed] : Medication risks reviewed [de-identified] : She will continue the diclofenac 75 mg twice a day.  She has been sent for an MRI of the lumbar spine and the next step would be epidural steroid injections or selective nerve root block.  The character of the pain suggests ganglionic irritation likely from a foraminal or far lateral disc.  She will call me a day or 2 after the MRI.  She has been given the name of the pain management specialist.  I will see her for follow-up 7 days after the first injection.

## 2021-06-28 RX ORDER — DICLOFENAC SODIUM 75 MG/1
75 TABLET, DELAYED RELEASE ORAL
Qty: 60 | Refills: 0 | Status: ACTIVE | COMMUNITY
Start: 2021-06-28 | End: 1900-01-01

## 2021-07-21 ENCOUNTER — APPOINTMENT (OUTPATIENT)
Dept: ORTHOPEDIC SURGERY | Facility: CLINIC | Age: 66
End: 2021-07-21
Payer: MEDICARE

## 2021-07-21 PROCEDURE — 99214 OFFICE O/P EST MOD 30 MIN: CPT

## 2021-07-21 RX ORDER — DICLOFENAC SODIUM 75 MG/1
75 TABLET, DELAYED RELEASE ORAL
Qty: 30 | Refills: 0 | Status: ACTIVE | COMMUNITY
Start: 2021-07-21 | End: 1900-01-01

## 2021-07-21 NOTE — HISTORY OF PRESENT ILLNESS
[de-identified] : She returns for follow-up of her back and right leg pain secondary to herniated disc.  When she was initially seen in early June she had lower back pain and right buttock and anterolateral thigh pain to the knee both of which were graded as a 10.  She had had a Medrol Dosepak from her primary care physician and I repeated that and she was started on diclofenac.  She was seen for follow-up on June 24 and the back pain had decreased to a 6 but the leg pain was still a 10.  She was sent for an MRI of the lumbar spine with plans to send her for epidural steroid injections.  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  I discussed with her that there is a good chance that this will reabsorb with time by itself.  She was continued on the diclofenac and returns for follow-up today.  She has not had problems tolerating the anti-inflammatory medicine currently the back pain is a 2 in the right buttock and leg pain is now centered mostly in the knee and graded as a 5.

## 2021-07-21 NOTE — DISCUSSION/SUMMARY
[Medication Risks Reviewed] : Medication risks reviewed [de-identified] : She is anxious to start exercising but she needs to continue to rest and restrict her activities.  She will continue the diclofenac 75 mg twice a day and I will see her for follow-up in 4 weeks.  She has been given a prescription to obtain a liver function profile 1 week prior to her next visit.

## 2021-07-21 NOTE — PHYSICAL EXAM
[de-identified] : He is considerably more comfortable today than she had been at either of her prior 2 visits. [de-identified] : Reviewed her MRI with her today in detail and again explained that I think there is an excellent chance that this fragment will reabsorb.

## 2021-08-19 ENCOUNTER — NON-APPOINTMENT (OUTPATIENT)
Age: 66
End: 2021-08-19

## 2021-08-23 ENCOUNTER — LABORATORY RESULT (OUTPATIENT)
Age: 66
End: 2021-08-23

## 2021-08-24 ENCOUNTER — APPOINTMENT (OUTPATIENT)
Dept: ORTHOPEDIC SURGERY | Facility: CLINIC | Age: 66
End: 2021-08-24
Payer: MEDICARE

## 2021-08-24 DIAGNOSIS — M54.16 RADICULOPATHY, LUMBAR REGION: ICD-10-CM

## 2021-08-24 DIAGNOSIS — M51.26 OTHER INTERVERTEBRAL DISC DISPLACEMENT, LUMBAR REGION: ICD-10-CM

## 2021-08-24 DIAGNOSIS — M54.41 LUMBAGO WITH SCIATICA, RIGHT SIDE: ICD-10-CM

## 2021-08-24 PROCEDURE — 99214 OFFICE O/P EST MOD 30 MIN: CPT

## 2021-08-24 RX ORDER — DICLOFENAC SODIUM 75 MG/1
75 TABLET, DELAYED RELEASE ORAL
Qty: 60 | Refills: 0 | Status: ACTIVE | COMMUNITY
Start: 2021-08-24 | End: 1900-01-01

## 2021-08-24 NOTE — HISTORY OF PRESENT ILLNESS
[de-identified] : She has not had problems tolerating the diclofenac.  Her liver function profile was normal.  The back pain that was initially quite severe and graded as a 10 was a 2 at the time of her last visit and remains a 2.  The right leg pain mostly in the lateral hip that was initially 10 and a 5 at the time of her last visit also remains a 5. [Pain Location] : pain [Improving] : improving

## 2021-08-24 NOTE — DISCUSSION/SUMMARY
[Medication Risks Reviewed] : Medication risks reviewed [de-identified] : She will continue the diclofenac 75 mg twice a day and I will see her for follow-up in 4 weeks.  If she has not made any sequential improvement in 2 weeks she will be sent for epidural steroid injections.

## 2021-08-24 NOTE — PHYSICAL EXAM
[de-identified] : On examination knee jerks remain 2+ and symmetrical and motor power is normal to manual testing in all lower extremity groups bilaterally. [de-identified] : I reviewed her MRI of the lumbar spine with her again.  She has a sequestered foraminal fragment that should reabsorb with time.

## 2021-08-25 ENCOUNTER — APPOINTMENT (OUTPATIENT)
Dept: PULMONOLOGY | Facility: CLINIC | Age: 66
End: 2021-08-25
Payer: MEDICARE

## 2021-08-25 VITALS
TEMPERATURE: 97.3 F | RESPIRATION RATE: 16 BRPM | HEART RATE: 71 BPM | DIASTOLIC BLOOD PRESSURE: 70 MMHG | OXYGEN SATURATION: 97 % | SYSTOLIC BLOOD PRESSURE: 110 MMHG

## 2021-08-25 PROCEDURE — 99214 OFFICE O/P EST MOD 30 MIN: CPT

## 2021-08-25 NOTE — HISTORY OF PRESENT ILLNESS
[FreeTextEntry1] : Ms. Zuniga is a 66 year old female with a history of abnormal lung CT, nicotine addiction, PND, poor sleep, and SOB presenting to the office today for a follow up visit. Her chief complaint is \par \par -she notes back pain early in may for one week \par -she notes having 2 head injured at 3 and 22 with fractured skull \par -she notes seeing Dr. Styles \par -she notes stopping smoking 3/2021 since surgery \par -s/p MRI herniated disk and cartilage broke off \par -she denies wheezing and coughing\par -she notes sleep improved \par -she denies palpitations\par -she notes lack of exercise since 3/2021\par -she notes weight gain \par -She notes bowels are regular \par \par - She  denies any visual issues, headaches, nausea, vomiting, fever, chills, sweats, chest pains, chest pressure, diarrhea, constipation, dysphagia, dizziness, leg swelling, leg pain, itchy eyes, itchy ears, heartburn, reflux, or sour taste in the mouth.

## 2021-08-25 NOTE — ASSESSMENT
[FreeTextEntry1] : Ms. Zuniga 66 year old with a history of hypertension, elevated cholesterol, hyperthyroidism, anxiety, prior pneumonia now comes to office for pulmonary re-evaluation. She continues to smoke around 1/2 ppd - s/p bronchitis (8/2019) - c/w asthma (stable) - but trying to stop smoking / mild dyspnea - improved with smoking cessation-#1 issue is back pain/ weight gain \par \par Her shortness of breath is multifactorial: \par overweight\par poor mechanics of breathing\par ?cardiac disease (Dr. Sanchez)\par lung reasons--? copd/asthma\par \par problem 1: ? asthma/COPD (due to smoker 30 years) -(s/p asthmatic bronchitis)\par -continue generic Advair (250) 1 inhalation BID \par - recommend Methacholine challenge to detect asthma\par - continue Ventolin 2 inhalations q6H\par Asthma is believed to be caused by inherited (genetic) and environmental factor, but its exact cause is\par unknown. Asthma may be triggered by allergens, lung infections, or irritants in the air. Asthma triggers are\par different for each person\par -COPD is a progressive disease and although it can’t be cured , appropriate management can slow its\par progression, reduce frequency and severity of exacerbations, and improve symptoms and the patient\par quality of life. Hospitalizations are the greatest contributor to the total COPD costs and account for up to\par 87% of total COPD related costs. Exacerbations are the main cause of admissions and subsequently\par account for the 40-75% of COPD costs. Inhaled maintenance therapy reduces the incidence of\par exacerbations in patients with stable COPD. Incorrect inhaler use and nonadherence are major obstacles\par to achieving COPD treatment goals. Many COPD patients have challenges (impaired inhalation, limited\par dexterity, reduced cognition: that limit their ability to correctly use their COPD treatment devices resulting\par in reduced symptom control. Of most importance is smoking cessation and early intervention with\par respiratory illnesses and contemplation for pulmonary rehab to enhance quality of life. \par \par problem 2: post nasal drip\par -  recommended Xlear saline solution \par Environmental measures for allergies were encouraged including mattress and pillow cover, air purifier, and environmental controls \par \par problem 3: nicotine addiction (01/19/2021)  - completed / resolved 04/20/2021 \par -Refused Nicotrol inhaler\par -recommended to see [facility] for assistance \par Discussed for five minutes with the patient the risks/associations with continued smoking including COPD,\par emphysema, shortness of breath, renal cancer, bladder cancer, stroke risk, cardiac disease, etc. Smoking\par cessation was discussed at length and highly encouraged. Various options to aid cessation was\par discussed including use of Chantix, Nicotrol, nicotine products, laser therapy, hypnosis, Wellbutrin, etc\par \par \par problem 4: lung cancer screening/abnormal CT of Chest\par -2 different types of nodules: 1 possibly due to measles and chicken pox and 1 ground glass nodule possibly related to bronchitis or pneumonia\par - S/p chest CT 4/2019; s/p CT 8/2021 - Next: 8/2022\par \par problem 5: Primary Snoring (ESA NOT Present)\par *Risks: snoring, elevated Mallampati cleft, fatigue\par - S/p home sleep study\par -recommended mouthpiece \par -recommended to use "Oxy Aid" \par - Sleep apnea is associated with adverse clinical consequences which an affect most organ systems.\par Cardiovascular disease risk includes arrhythmias, atrial fibrillation, hypertension, coronary artery disease,\par and stroke. Metabolic disorders include diabetes type 2, non-alcoholic fatty liver disease. Mood disorder\par especially depression; and cognitive decline especially in the elderly. Associations with chronic\par reflux/Montilla’s esophagus some but not all inclusive.\par -Reasons include arousal consistent with hypopnea; respiratory events most prominent in REM sleep or\par supine position; therefore sleep staging and body position are important for accurate diagnosis and\par estimation of AHI.\par Good sleep hygiene was encouraged including avoiding watching television an hour before bed, keeping\par caffeine at a low, avoiding reading, television, or anything, in bed, no drinking any liquids three hours\par before bedtime, and only getting into bed when tired and ready for sleep.\par \par problem 6: overweight\par -Recommended "Food Fuels"\par -Weight loss, exercise, and diet control were discussed and are highly encouraged. Treatment options\par were given such as, aqua therapy, and contacting a nutritionist. Recommended to use the elliptical,\par stationary bike, less use of treadmill. Mindful eating was explained to the patient Obesity is associated\par with worsening asthma, shortness of breath, and potential for cardiac disease, diabetes, and other\par underlying medical conditions. \par \par Problem 8: Health Maintenance/COVID19 Precautions:\par -s/p Covid-19 vaccine\par \par - Clean your hands often. Wash your hands often with soap and water for at least 20 seconds, especially after blowing your nose, coughing, or sneezing, or having been in a public place.\par - If soap and water are not available, use a hand  that contains at least 60% alcohol.\par - To the extent possible, avoid touching high-touch surfaces in public places - elevator buttons, door handles, handrails, handshaking with people, etc. Use a tissue or your sleeve to cover your hand or finger if you must touch something.\par - Wash your hands after touching surfaces in public places.\par - Avoid touching your face, nose, eyes, etc.\par - Clean and disinfect your home to remove germs: practice routine cleaning of frequently touched surfaces (for example: tables, doorknobs, light switches, handles, desks, toilets, faucets, sinks & cell phones)\par - Avoid crowds, especially in poorly ventilated spaces. Your risk of exposure to respiratory viruses like COVID-19 may increase in crowded, closed-in settings with little air circulation if\par there are people in the crowd who are sick. All patients are recommended to practice social distancing and stay at least 6 feet away from others.\par - Avoid all non-essential travel including plane trips, and especially avoid embarking on cruise ships.\par -If COVID-19 is spreading in your community, take extra measures to put distance between yourself and other people to further reduce your risk of being exposed to this new virus.\par -Stay home as much as possible.\par - Consider ways of getting food brought to your house through family, social, or commercial networks\par -Be aware that the virus has been known to live in the air up to 3 hours post exposure, cardboard up to 24 hours post exposure, copper up to 4 hours post exposure, steel and plastic up to 2-3 days post exposure. Risk of transmission from these surfaces are affected by many variables.\par \par Immune Support Recommendations:\par -OTC Vitamin C 500mg BID \par -OTC Quercetin 250-500mg BID \par -OTC Zinc 75-100mg per day \par -OTC Melatonin 1or 2mg a night \par -OTC Vitamin D 1-4000mg per day\par -Tonic Water 8oz\par -Recommended to Stay Hydrated (At least a gallon/day)\par \par Asthma and COVID19:\par You need to make sure your asthma is under control. This often requires the use of inhaled corticosteroids (and sometimes oral corticosteroids). Inhaled corticosteroids do not likely reduce your immune system’s ability to fight infections, but oral corticosteroids may. It is important to use the steps above to protect yourself to limit your exposure to any respiratory virus. \par \par problem 8: health maintenance\par -s/p influenza shot - 9/2020\par -recommended strep pneumonia vaccines: Prevnar-13 vaccine, followed by Pneumo vaccine 23 one year\par following (completed)\par -recommended early intervention for URIs\par -recommended regular osteoporosis evaluations\par -recommended early dermatological evaluations\par -recommended after the age of 50 to the age of 70, colonoscopy every 5 years\par \par

## 2021-08-25 NOTE — PROCEDURE
[FreeTextEntry1] : CT (8.16.2021) revealed stable tiny lung nodules. No new or enlarged nodules are identified. Ill-defined bilateral thyroid nodules again noted.\par

## 2021-08-25 NOTE — ADDENDUM
[FreeTextEntry1] : Documented by Hailey Tran acting as a scribe for Dr. Sunny Redmond on 08/25/2021 \par \par All medical record entries made by the Scribe were at my, Dr. Sunny Redmond's, direction and personally dictated by me on 08/25/2021 . I have reviewed the chart and agree that the record accurately reflects my personal performance of the history, physical exam, assessment and plan. I have also personally directed, reviewed, and agree with the discharge instructions

## 2021-09-21 ENCOUNTER — APPOINTMENT (OUTPATIENT)
Dept: ORTHOPEDIC SURGERY | Facility: CLINIC | Age: 66
End: 2021-09-21

## 2021-12-07 ENCOUNTER — APPOINTMENT (OUTPATIENT)
Dept: PULMONOLOGY | Facility: CLINIC | Age: 66
End: 2021-12-07

## 2022-01-21 NOTE — ASU PATIENT PROFILE, ADULT - PREOP PAIN SCORE
90% relief  
Procedure:   Intrarticular Hip Injection Under Fluoroscopic Guidance    Laterality:   bilateral    PATIENT NAME  Kane Andino III 56 year old male    SURGEON  Jonah Lai MD    ASSISTANTS  None    SURGICAL TASKS PERFORMED BY ASSISTANT  None    PRE-OP DIAGNOSIS  Hip joint arthritis    POST-OP DIAGNOSIS  Hip joint arthritis    ANESTHESIA TYPE  Local    PROCEDURE DESCRIPTION:    A consent was obtained for the procedure that was non exhaustive and included the risks benefits and alternatives of the procedure.  Specific risks discussed included bleeding, infection.  The patient was taken to the procedure room and placed in the supine position.    The groin was prepped and draped in a sterile manner.       An AP film was taken of the groin.  The femoral head, the acetabulum, and the femoral neck were visualized.  A maggie was made over the lateral aspect of the femoral head/neck junction.  The femoral artery pulsation was palpated, and it was noted that the needle entry site was lateral to the femoral artery.  A skin wheal was made with 1% lidocaine.  The needle was advanced into the joint capsule under fluoroscopic guidance.  Aspiration was negative.  Contrast confirmed dye spreading around the joint space.  There was low resistance to injection.    At this point 8 cc's of a mixture of 1% lidocaine and 40mg of kenalog were injected intermittently with aspiration.  The needle was then flushed with 1cc of 1% lidocaine and withdrawn. A bandage was placed over the site and the patient was observed.  No complications were noted and the patient was discharged to home in stable condition.    Patient has been reassessed and is ready for discharge.    COMPLICATIONS  None    IMPLANTS/GRAFTS  None    SPECIMENS REMOVED  None    ESTIMATED BLOOD LOSS  2cc    FINDINGS  None      The procedure was repeated on the opposite side, in the same manner, without complication.    
0

## 2022-09-26 ENCOUNTER — APPOINTMENT (OUTPATIENT)
Dept: PULMONOLOGY | Facility: CLINIC | Age: 67
End: 2022-09-26

## 2022-09-26 VITALS
TEMPERATURE: 97.7 F | WEIGHT: 200 LBS | RESPIRATION RATE: 16 BRPM | DIASTOLIC BLOOD PRESSURE: 70 MMHG | HEIGHT: 63 IN | SYSTOLIC BLOOD PRESSURE: 124 MMHG | HEART RATE: 81 BPM | OXYGEN SATURATION: 98 % | BODY MASS INDEX: 35.44 KG/M2

## 2022-09-26 PROCEDURE — ZZZZZ: CPT

## 2022-09-26 PROCEDURE — 99213 OFFICE O/P EST LOW 20 MIN: CPT

## 2022-09-26 NOTE — HISTORY OF PRESENT ILLNESS
[FreeTextEntry1] : VISIT 8/25/2021:\par Ms. Zuniga is a 66 year old female with a history of abnormal lung CT, nicotine addiction, PND, poor sleep, and SOB presenting to the office today for a follow up visit. Her chief complaint is \par \par -she notes back pain early in may for one week \par -she notes having 2 head injured at 3 and 22 with fractured skull \par -she notes seeing Dr. Styles \par -she notes stopping smoking 3/2021 since surgery \par -s/p MRI herniated disk and cartilage broke off \par -she denies wheezing and coughing\par -she notes sleep improved \par -she denies palpitations\par -she notes lack of exercise since 3/2021\par -she notes weight gain \par -She notes bowels are regular \par \par - She  denies any visual issues, headaches, nausea, vomiting, fever, chills, sweats, chest pains, chest pressure, diarrhea, constipation, dysphagia, dizziness, leg swelling, leg pain, itchy eyes, itchy ears, heartburn, reflux, or sour taste in the mouth. \par \par VISIT TODAY 9/26/2022:\par Ms. Zuniga is a 67 year old female with a history of abnormal lung CT, Former smoker, PND, poor sleep, and SOB presenting to the office today for a follow up visit.\par \par She reports she is in her normal state of health. \par She has been added methimazole for her hyperthyroidism \par She now has a dx of DM2- not on medications\par \par She reports one year ago dealing with debilitating back pain that resulted in not being able to walk for almost one year. She started walking this summer and got much more active in the last 6 weeks. She is doing much better with activity overall. She denies any issues with SOB, wheezing with exertion. \par She denies any cough, chest pain or pressure, chest tightness. \par She does not take any inhalers- she reports big issue with inhalers not being covered but doesn’t feel the need for any SOB relief. \par She has continued with smoking cessation. \par She is due for CT scan follow up as well.\par No new complaints.

## 2022-09-26 NOTE — REASON FOR VISIT
[Other: _____] : [unfilled] [TextBox_44] : bnormal lung CT, nicotine addiction, PND, poor sleep, and SOB. \par

## 2022-09-26 NOTE — PHYSICAL EXAM
[No Acute Distress] : no acute distress [Normal Oropharynx] : normal oropharynx [III] : Mallampati Class: III [No Neck Mass] : no neck mass [Normal Rate/Rhythm] : normal rate/rhythm [Normal S1, S2] : normal s1, s2 [No Murmurs] : no murmurs [No Resp Distress] : no resp distress [Clear to Auscultation Bilaterally] : clear to auscultation bilaterally [No Abnormalities] : no abnormalities [Benign] : benign [Normal Gait] : normal gait [No Clubbing] : no clubbing [No Cyanosis] : no cyanosis [No Edema] : no edema [FROM] : FROM [Normal Color/ Pigmentation] : normal color/ pigmentation [No Focal Deficits] : no focal deficits [Oriented x3] : oriented x3 [Normal Affect] : normal affect [Well Nourished] : well nourished [Normal Appearance] : normal appearance [Well Groomed] : well groomed [No Deformities] : no deformities [Well Developed] : well developed [Supple] : supple [No JVD] : no jvd [No Acc Muscle Use] : no acc muscle use [Normal Palpation] : normal palpation [Normal Rhythm and Effort] : normal rhythm and effort [Normal Mood] : normal mood [Remote Memory Normal] : remote memory normal [TextBox_68] : I:E 1:3, clear

## 2022-09-26 NOTE — REVIEW OF SYSTEMS
[Negative] : Psychiatric [Diabetes] : diabetes [Thyroid Problem] : thyroid problem [Obesity] : obesity

## 2022-09-26 NOTE — PROCEDURE
[FreeTextEntry1] : PFTs\par SPI: WNL- normal flows\par normal lung volumes\par normal diffusion capacity

## 2022-09-26 NOTE — ASSESSMENT
[FreeTextEntry1] : Ms. Zuniga is a 67 year old female with a history of abnormal lung CT, Former smoker, PND, poor sleep, and SOB presenting to the office today for a follow up visit:\par \par \par problem 1: ? asthma/COPD (due to smoker 30 years) -stable, asymptomatic\par -off inhalers and asymptomatic\par -recommend early intervention for URI\par \par problem 2: post nasal drip\par -  recommended Xlear saline solution \par Environmental measures for allergies were encouraged including mattress and pillow cover, air purifier, and environmental controls\par \par problem 3: lung cancer screening/abnormal CT of Chest\par -2 different types of nodules: 1 possibly due to measles and chicken pox and 1 ground glass nodule possibly related to bronchitis or pneumonia\par - S/p chest CT 4/2019; s/p CT 8/2021, Due for CT now, advised her to get her prior discs and go to [facility], she was agreeable. \par \par Pt to follow up in 4 months with Dr. Redmond \par Call with any questions or concerns\par We will call with imaging results\par

## 2022-10-14 ENCOUNTER — NON-APPOINTMENT (OUTPATIENT)
Age: 67
End: 2022-10-14

## 2022-11-01 ENCOUNTER — NON-APPOINTMENT (OUTPATIENT)
Age: 67
End: 2022-11-01

## 2023-01-06 NOTE — ASU PREOP CHECKLIST - LATEX ALLERGY
[Time Spent: ___ minutes] : I have spent [unfilled] minutes of time on the encounter. [FreeTextEntry3] : I personally saw and examined NORBERTO TAMEZ in detail. I spoke to ALVIN Blanco regarding the assessment and plan of care. I reviewed the above assessment and plan of care, and agree. I have made changes in changes in the body of the note where appropriate.I personally reviewed the HPI, PMH, FH, SH, ROS and medications/allergies. I have spoken to ALVIN Blanco regarding the history and have personally determined the assessment and plan of care, and documented this myself. I was present and participated in all key portions of the encounter and all procedures noted above. I have made changes in the body of the note where appropriate.\par \par Attesting Faculty: See Attending Signature Below \par \par \par  yes

## 2023-01-26 ENCOUNTER — NON-APPOINTMENT (OUTPATIENT)
Age: 68
End: 2023-01-26

## 2023-01-26 ENCOUNTER — APPOINTMENT (OUTPATIENT)
Dept: PULMONOLOGY | Facility: CLINIC | Age: 68
End: 2023-01-26
Payer: MEDICARE

## 2023-01-26 VITALS
DIASTOLIC BLOOD PRESSURE: 80 MMHG | OXYGEN SATURATION: 97 % | WEIGHT: 195 LBS | HEIGHT: 63 IN | HEART RATE: 85 BPM | SYSTOLIC BLOOD PRESSURE: 124 MMHG | BODY MASS INDEX: 34.55 KG/M2 | TEMPERATURE: 97.7 F | RESPIRATION RATE: 16 BRPM

## 2023-01-26 PROCEDURE — 95012 NITRIC OXIDE EXP GAS DETER: CPT

## 2023-01-26 PROCEDURE — 94010 BREATHING CAPACITY TEST: CPT

## 2023-01-26 PROCEDURE — 99214 OFFICE O/P EST MOD 30 MIN: CPT | Mod: 25

## 2023-01-26 RX ORDER — BUDESONIDE AND FORMOTEROL FUMARATE DIHYDRATE 160; 4.5 UG/1; UG/1
160-4.5 AEROSOL RESPIRATORY (INHALATION) TWICE DAILY
Qty: 3 | Refills: 1 | Status: ACTIVE | COMMUNITY
Start: 2023-01-26 | End: 1900-01-01

## 2023-01-26 NOTE — HISTORY OF PRESENT ILLNESS
[FreeTextEntry1] : Ms. Zuniga is a 67 year old female with a history of abnormal lung CT, nicotine addiction, PND, poor sleep, and SOB presenting to the office today for a follow up visit. Her chief complaint \par - she notes she has not been feeling like her old self \par - she notes ever since she stopped going to the gym since COVID she has not been feeling like herself. \par - she note she has smoked a bit. \par - bowels are regular \par - she notes she has not been doing any exercises at home due to her bad back, and other aches and pains. \par - no chest pains / pressure \par - no swallowing issues\par - no sour taste in the mouth \par - she has been getting enough sleep; about 6-8 hrs a night \par - her energy levels have not been well, she always feels tired\par - she notes she has not been snoring. She was told shes not snoring as bad anymore. \par - she notes she takes half a pill for her thyroid. \par - she notes right now she has diabetes, her doctor is trying to get her on Trulicity. Her insurance wont cover Ozempic. \par - no other new medications or supplements. \par - she notes her insurance does not cover her current inhalers. \par -She denies any visual issues, headaches, nausea, vomiting, fever, chills, sweats, chest pains, chest pressure, diarrhea, constipation, dysphagia, dizziness, leg swelling, leg pain, itchy eyes, itchy ears.

## 2023-01-26 NOTE — PROCEDURE
[FreeTextEntry1] : PFT reveals normal flows, with an FEV1 of   2.03L, which is  107% of predicted, with normal flow volume loop \par \par \par CT (OCT.13.2022) impression: less than or equal to 3mm nodules at the right apex and in the left lower lobe of doubtful clinical significance. Given the provided clinical history or pulmonary nodules, an outside CT is made available for review, having add an addendum. If unavailable, consider low-dose CT follow up to reassess in 12 months if the patient is considered high risk (significant smoking history, etc)\par Atherosclerotic disease. Mild aortic valve leaflet calcification, without aneurysm. \par Mildly prominent and nodular appearing thyroid gland was assessed by ultrasound 9/27/2022. \par \par \par Sleep study (jan.28.2019) revealed sleep apnea with an AHI/MAYA of 4.2 , snore index of 30.0% and a low oxygen saturation of 87%\par \par FENO was 31 ; normal value being less than 25\par Fractional exhaled nitric oxide (FENO) is regarded as a simple, noninvasive method for assessing eosinophilic airway inflammation. Produced by a variety of cells within the lung, nitric oxide (NO) concentrations are generally low in healthy individuals. However, high concentrations of NO appear to be involved in nonspecific host defense mechanisms and chronic inflammatory diseases such as asthma. The American Thor[employer] Society (ATS) therefore has recommended using FENO to aid in the diagnosis and monitoring of eosinophilic airway inflammation and asthma, and for identifying steroid responsive individuals whose chronic respiratory symptoms may be airway inflammation.

## 2023-01-26 NOTE — ADDENDUM
[FreeTextEntry1] : Documented by Stacia Gorman acting as a scribe for Dr. Sunny Redmond on 01/26/2023 \par \par All medical record entries made by the Scribe were at my, Dr. Sunny Redmond's, direction and personally dictated by me on 01/26/2023 . I have reviewed the chart and agree that the record accurately reflects my personal performance of the history, physical exam, assessment and plan. I have also personally directed, reviewed, and agree with the discharge instructions.

## 2023-01-26 NOTE — ASSESSMENT
[FreeTextEntry1] : Ms. Zuniga 67 year old with a history of hypertension, elevated cholesterol, hyperthyroidism, anxiety, prior pneumonia now comes to office for pulmonary re-evaluation. She continues to smoke around 1/2 ppd - s/p bronchitis (8/2019) - c/w asthma (stable) - but trying to stop smoking / mild dyspnea - improved with smoking cessation-#1 issue is back pain/ weight gain / exhaustion \par \par Her shortness of breath is multifactorial: \par overweight\par poor mechanics of breathing\par ?cardiac disease (Dr. Sanchez)\par lung reasons--? copd/asthma\par \par problem 1: ? asthma/COPD (due to smoker 30 years) -(s/p asthmatic bronchitis)\par -continue generic Advair (250) 1 inhalation BID \par -add Symbicort 160 2 inhalations BID (generic is fine)\par - recommend Methacholine challenge to detect asthma\par - continue Ventolin 2 inhalations q6H\par Asthma is believed to be caused by inherited (genetic) and environmental factor, but its exact cause is\par unknown. Asthma may be triggered by allergens, lung infections, or irritants in the air. Asthma triggers are\par different for each person\par -COPD is a progressive disease and although it can’t be cured , appropriate management can slow its\par progression, reduce frequency and severity of exacerbations, and improve symptoms and the patient\par quality of life. Hospitalizations are the greatest contributor to the total COPD costs and account for up to\par 87% of total COPD related costs. Exacerbations are the main cause of admissions and subsequently\par account for the 40-75% of COPD costs. Inhaled maintenance therapy reduces the incidence of\par exacerbations in patients with stable COPD. Incorrect inhaler use and nonadherence are major obstacles\par to achieving COPD treatment goals. Many COPD patients have challenges (impaired inhalation, limited\par dexterity, reduced cognition: that limit their ability to correctly use their COPD treatment devices resulting\par in reduced symptom control. Of most importance is smoking cessation and early intervention with\par respiratory illnesses and contemplation for pulmonary rehab to enhance quality of life. \par \par problem 2: post nasal drip\par -  recommended Xlear saline solution \par Environmental measures for allergies were encouraged including mattress and pillow cover, air purifier, and environmental controls \par \par problem 3: nicotine addiction (discussed 1/26/2023)\par -Refused Nicotrol inhaler\par -recommended to see [facility] for assistance \par Discussed for five minutes with the patient the risks/associations with continued smoking including COPD,\par emphysema, shortness of breath, renal cancer, bladder cancer, stroke risk, cardiac disease, etc. Smoking\par cessation was discussed at length and highly encouraged. Various options to aid cessation was\par discussed including use of Chantix, Nicotrol, nicotine products, laser therapy, hypnosis, Wellbutrin, etc\par \par \par problem 4: lung cancer screening/abnormal CT of Chest - stable \par -2 different types of nodules: 1 possibly due to measles and chicken pox and 1 ground glass nodule possibly related to bronchitis or pneumonia\par - S/p chest CT 4/2019; s/p CT 8/2021 - 8/2022- next 10/2023 \par \par problem 5: Primary Snoring (SEA NOT Present)\par *Risks: snoring, elevated Mallampati cleft, fatigue\par - S/p home sleep study\par -recommended mouthpiece \par -recommended to use "Oxy Aid" \par - Sleep apnea is associated with adverse clinical consequences which an affect most organ systems.\par Cardiovascular disease risk includes arrhythmias, atrial fibrillation, hypertension, coronary artery disease,\par and stroke. Metabolic disorders include diabetes type 2, non-alcoholic fatty liver disease. Mood disorder\par especially depression; and cognitive decline especially in the elderly. Associations with chronic\par reflux/Montilla’s esophagus some but not all inclusive.\par -Reasons include arousal consistent with hypopnea; respiratory events most prominent in REM sleep or\par supine position; therefore sleep staging and body position are important for accurate diagnosis and\par estimation of AHI.\par Good sleep hygiene was encouraged including avoiding watching television an hour before bed, keeping\par caffeine at a low, avoiding reading, television, or anything, in bed, no drinking any liquids three hours\par before bedtime, and only getting into bed when tired and ready for sleep.\par \par problem 6: overweight\par -Recommended "Food Fuels" etc \par -Weight loss, exercise, and diet control were discussed and are highly encouraged. Treatment options\par were given such as, aqua therapy, and contacting a nutritionist. Recommended to use the elliptical,\par stationary bike, less use of treadmill. Mindful eating was explained to the patient Obesity is associated\par with worsening asthma, shortness of breath, and potential for cardiac disease, diabetes, and other\par underlying medical conditions. \par \par Problem 8: Health Maintenance/COVID19 Precautions:\par -s/p Covid-19 vaccine\par - recommended Sanotyze \par - Clean your hands often. Wash your hands often with soap and water for at least 20 seconds, especially after blowing your nose, coughing, or sneezing, or having been in a public place.\par - If soap and water are not available, use a hand  that contains at least 60% alcohol.\par - To the extent possible, avoid touching high-touch surfaces in public places - elevator buttons, door handles, handrails, handshaking with people, etc. Use a tissue or your sleeve to cover your hand or finger if you must touch something.\par - Wash your hands after touching surfaces in public places.\par - Avoid touching your face, nose, eyes, etc.\par - Clean and disinfect your home to remove germs: practice routine cleaning of frequently touched surfaces (for example: tables, doorknobs, light switches, handles, desks, toilets, faucets, sinks & cell phones)\par - Avoid crowds, especially in poorly ventilated spaces. Your risk of exposure to respiratory viruses like COVID-19 may increase in crowded, closed-in settings with little air circulation if\par there are people in the crowd who are sick. All patients are recommended to practice social distancing and stay at least 6 feet away from others.\par - Avoid all non-essential travel including plane trips, and especially avoid embarking on cruise ships.\par -If COVID-19 is spreading in your community, take extra measures to put distance between yourself and other people to further reduce your risk of being exposed to this new virus.\par -Stay home as much as possible.\par - Consider ways of getting food brought to your house through family, social, or commercial networks\par -Be aware that the virus has been known to live in the air up to 3 hours post exposure, cardboard up to 24 hours post exposure, copper up to 4 hours post exposure, steel and plastic up to 2-3 days post exposure. Risk of transmission from these surfaces are affected by many variables.\par \par Immune Support Recommendations:\par -OTC Vitamin C 500mg BID \par -OTC Quercetin 250-500mg BID \par -OTC Zinc 75-100mg per day \par -OTC Melatonin 1or 2mg a night \par -OTC Vitamin D 1-4000mg per day\par -Tonic Water 8oz\par -Recommended to Stay Hydrated (At least a gallon/day)\par \par Asthma and COVID19:\par You need to make sure your asthma is under control. This often requires the use of inhaled corticosteroids (and sometimes oral corticosteroids). Inhaled corticosteroids do not likely reduce your immune system’s ability to fight infections, but oral corticosteroids may. It is important to use the steps above to protect yourself to limit your exposure to any respiratory virus. \par \par problem 8: health maintenance\par -s/p influenza shot - 9/2020\par -recommended strep pneumonia vaccines: Prevnar-13 vaccine, followed by Pneumo vaccine 23 one year\par following (completed)\par -recommended early intervention for URIs\par -recommended regular osteoporosis evaluations\par -recommended early dermatological evaluations\par -recommended after the age of 50 to the age of 70, colonoscopy every 5 years\par \par

## 2023-06-08 ENCOUNTER — APPOINTMENT (OUTPATIENT)
Dept: PULMONOLOGY | Facility: CLINIC | Age: 68
End: 2023-06-08
Payer: MEDICARE

## 2023-06-08 VITALS
WEIGHT: 190 LBS | RESPIRATION RATE: 16 BRPM | HEIGHT: 63 IN | DIASTOLIC BLOOD PRESSURE: 80 MMHG | BODY MASS INDEX: 33.66 KG/M2 | TEMPERATURE: 97.6 F | SYSTOLIC BLOOD PRESSURE: 118 MMHG | HEART RATE: 70 BPM | OXYGEN SATURATION: 98 %

## 2023-06-08 PROCEDURE — 94727 GAS DIL/WSHOT DETER LNG VOL: CPT

## 2023-06-08 PROCEDURE — 99214 OFFICE O/P EST MOD 30 MIN: CPT | Mod: 25

## 2023-06-08 PROCEDURE — 95012 NITRIC OXIDE EXP GAS DETER: CPT

## 2023-06-08 PROCEDURE — ZZZZZ: CPT

## 2023-06-08 PROCEDURE — 94729 DIFFUSING CAPACITY: CPT

## 2023-06-08 PROCEDURE — 94010 BREATHING CAPACITY TEST: CPT

## 2023-06-08 NOTE — ASSESSMENT
[FreeTextEntry1] : Ms. Zuniga 68 year old with a history of hypertension, elevated cholesterol, hyperthyroidism, anxiety, prior pneumonia now comes to office for pulmonary re-evaluation. She continues to smoke around 1/2 ppd - s/p bronchitis (8/2019) - c/w asthma (stable) - but trying to stop smoking / mild dyspnea - improved with smoking cessation-#1 issue is weight \par \par Her shortness of breath is multifactorial: \par overweight\par poor mechanics of breathing\par ?cardiac disease (Dr. Sanchez)\par lung reasons--? copd/asthma\par \par problem 1: ? asthma/COPD (due to smoker 30 years) -(s/p asthmatic bronchitis)\par -continue generic Advair (250) 1 inhalation BID or Symbicort 160 2 inhalations BID \par -add Symbicort 160 2 inhalations BID (generic is fine)\par - recommend Methacholine challenge to detect asthma\par - continue Ventolin 2 inhalations q6H\par Asthma is believed to be caused by inherited (genetic) and environmental factor, but its exact cause is\par unknown. Asthma may be triggered by allergens, lung infections, or irritants in the air. Asthma triggers are\par different for each person\par -COPD is a progressive disease and although it can’t be cured , appropriate management can slow its\par progression, reduce frequency and severity of exacerbations, and improve symptoms and the patient\par quality of life. Hospitalizations are the greatest contributor to the total COPD costs and account for up to\par 87% of total COPD related costs. Exacerbations are the main cause of admissions and subsequently\par account for the 40-75% of COPD costs. Inhaled maintenance therapy reduces the incidence of\par exacerbations in patients with stable COPD. Incorrect inhaler use and nonadherence are major obstacles\par to achieving COPD treatment goals. Many COPD patients have challenges (impaired inhalation, limited\par dexterity, reduced cognition: that limit their ability to correctly use their COPD treatment devices resulting\par in reduced symptom control. Of most importance is smoking cessation and early intervention with\par respiratory illnesses and contemplation for pulmonary rehab to enhance quality of life. \par \par problem 2: post nasal drip\par -  recommended Xlear saline solution \par Environmental measures for allergies were encouraged including mattress and pillow cover, air purifier, and environmental controls \par \par problem 3: nicotine addiction (discussed 1/26/2023)\par -Refused Nicotrol inhaler\par -recommended to see [facility] for assistance \par Discussed for five minutes with the patient the risks/associations with continued smoking including COPD,\par emphysema, shortness of breath, renal cancer, bladder cancer, stroke risk, cardiac disease, etc. Smoking\par cessation was discussed at length and highly encouraged. Various options to aid cessation was\par discussed including use of Chantix, Nicotrol, nicotine products, laser therapy, hypnosis, Wellbutrin, etc\par \par \par problem 4: lung cancer screening/abnormal CT of Chest - stable \par -2 different types of nodules: 1 possibly due to measles and chicken pox and 1 ground glass nodule possibly related to bronchitis or pneumonia\par - S/p chest CT 4/2019; s/p CT 8/2021 - 8/2022- next 10/2023 \par \par problem 5: Primary Snoring (SEA NOT Present)\par *Risks: snoring, elevated Mallampati cleft, fatigue\par - S/p home sleep study\par -recommended mouthpiece \par -recommended to use "Oxy Aid" \par - Sleep apnea is associated with adverse clinical consequences which an affect most organ systems.\par Cardiovascular disease risk includes arrhythmias, atrial fibrillation, hypertension, coronary artery disease,\par and stroke. Metabolic disorders include diabetes type 2, non-alcoholic fatty liver disease. Mood disorder\par especially depression; and cognitive decline especially in the elderly. Associations with chronic\par reflux/Montilla’s esophagus some but not all inclusive.\par -Reasons include arousal consistent with hypopnea; respiratory events most prominent in REM sleep or\par supine position; therefore sleep staging and body position are important for accurate diagnosis and\par estimation of AHI.\par Good sleep hygiene was encouraged including avoiding watching television an hour before bed, keeping\par caffeine at a low, avoiding reading, television, or anything, in bed, no drinking any liquids three hours\par before bedtime, and only getting into bed when tired and ready for sleep.\par \par problem 6: overweight\par -Recommended "Food Fuels" etc \par -Weight loss, exercise, and diet control were discussed and are highly encouraged. Treatment options\par were given such as, aqua therapy, and contacting a nutritionist. Recommended to use the elliptical,\par stationary bike, less use of treadmill. Mindful eating was explained to the patient Obesity is associated\par with worsening asthma, shortness of breath, and potential for cardiac disease, diabetes, and other\par underlying medical conditions. \par \par Problem 8: Health Maintenance/COVID19 Precautions:\par -s/p Covid-19 vaccine\par - recommended Sanotyze \par - Clean your hands often. Wash your hands often with soap and water for at least 20 seconds, especially after blowing your nose, coughing, or sneezing, or having been in a public place.\par - If soap and water are not available, use a hand  that contains at least 60% alcohol.\par - To the extent possible, avoid touching high-touch surfaces in public places - elevator buttons, door handles, handrails, handshaking with people, etc. Use a tissue or your sleeve to cover your hand or finger if you must touch something.\par - Wash your hands after touching surfaces in public places.\par - Avoid touching your face, nose, eyes, etc.\par - Clean and disinfect your home to remove germs: practice routine cleaning of frequently touched surfaces (for example: tables, doorknobs, light switches, handles, desks, toilets, faucets, sinks & cell phones)\par - Avoid crowds, especially in poorly ventilated spaces. Your risk of exposure to respiratory viruses like COVID-19 may increase in crowded, closed-in settings with little air circulation if\par there are people in the crowd who are sick. All patients are recommended to practice social distancing and stay at least 6 feet away from others.\par - Avoid all non-essential travel including plane trips, and especially avoid embarking on cruise ships.\par -If COVID-19 is spreading in your community, take extra measures to put distance between yourself and other people to further reduce your risk of being exposed to this new virus.\par -Stay home as much as possible.\par - Consider ways of getting food brought to your house through family, social, or commercial networks\par -Be aware that the virus has been known to live in the air up to 3 hours post exposure, cardboard up to 24 hours post exposure, copper up to 4 hours post exposure, steel and plastic up to 2-3 days post exposure. Risk of transmission from these surfaces are affected by many variables.\par \par Immune Support Recommendations:\par -OTC Vitamin C 500mg BID \par -OTC Quercetin 250-500mg BID \par -OTC Zinc 75-100mg per day \par -OTC Melatonin 1or 2mg a night \par -OTC Vitamin D 1-4000mg per day\par -Tonic Water 8oz\par -Recommended to Stay Hydrated (At least a gallon/day)\par \par Asthma and COVID19:\par You need to make sure your asthma is under control. This often requires the use of inhaled corticosteroids (and sometimes oral corticosteroids). Inhaled corticosteroids do not likely reduce your immune system’s ability to fight infections, but oral corticosteroids may. It is important to use the steps above to protect yourself to limit your exposure to any respiratory virus. \par \par problem 8: health maintenance\par -s/p influenza shot - 9/2020\par -recommended strep pneumonia vaccines: Prevnar-13 vaccine, followed by Pneumo vaccine 23 one year\par following (completed)\par -recommended early intervention for URIs\par -recommended regular osteoporosis evaluations\par -recommended early dermatological evaluations\par -recommended after the age of 50 to the age of 70, colonoscopy every 5 years\par \par

## 2023-06-08 NOTE — ADDENDUM
[FreeTextEntry1] : Documented by Hailey Tran acting as a scribe for Dr. Sunny Redmond on 06/08/2023 .\par \par All medical record entries made by the Scribe were at my, Dr. Sunny Redmond's, direction and personally dictated by me on 06/08/2023. I have reviewed the chart and agree that the record accurately reflects my personal performance of the history, physical exam, assessment and plan. I have also personally directed, reviewed, and agree with the discharge instructions.

## 2023-06-08 NOTE — PROCEDURE
[FreeTextEntry1] : Full PFT reveals normal flows; FEV1 was 2.55 L which is 117% of predicted; normal lung volumes; normal diffusion at 19.1, which is 88% of predicted; normal flow volume loop.\par PFTs were performed to evaluate for COPD and SOB\par \par FENO was 28; a normal value being less than 25\par Fractional exhaled nitric oxide (FENO) is regarded as a simple, noninvasive method for assessing eosinophilic airway inflammation. Produced by a variety of cells within the lung, nitric oxide (NO) concentrations are generally low in healthy individuals. However, high concentrations of NO appear to be involved in nonspecific host defense mechanisms and chronic inflammatory diseases such as asthma. The American Thor[employer] Society (ATS) therefore has recommended using FENO to aid in the diagnosis and monitoring of eosinophilic airway inflammation and asthma, and for identifying steroid responsive individuals whose chronic respiratory symptoms may be caused by airway inflammation.

## 2023-06-08 NOTE — HISTORY OF PRESENT ILLNESS
[FreeTextEntry1] : Ms. Zuniga is a 68 year old female with a history of abnormal lung CT, nicotine addiction, PND, poor sleep, and SOB presenting to the office today for a follow up visit. Her chief complaint is \par \par -she notes generally feeling good \par -she notes issues with current haze \par -she notes recent headaches and tearing due to current haze \par -she notes vision is stable \par -she notes weight is stable \par -she notes regular exercise going to the gym and playing pickle ball\par -she notes breathing has been mildly worsened by recent haze but was stable prior \par -she notes back pain has improved \par \par -she denies any nausea, emesis, fever, chills, sweats, chest pain, chest pressure, coughing, wheezing, palpitations, constipation, diarrhea, vertigo, dysphagia, heartburn, reflux, itchy eyes, itchy ears, leg swelling, arthralgias, myalgias, or sour taste in the mouth.

## 2023-09-08 ENCOUNTER — APPOINTMENT (OUTPATIENT)
Dept: PULMONOLOGY | Facility: CLINIC | Age: 68
End: 2023-09-08
Payer: MEDICARE

## 2023-09-08 ENCOUNTER — APPOINTMENT (OUTPATIENT)
Dept: PULMONOLOGY | Facility: CLINIC | Age: 68
End: 2023-09-08

## 2023-09-08 VITALS
HEART RATE: 76 BPM | OXYGEN SATURATION: 97 % | DIASTOLIC BLOOD PRESSURE: 72 MMHG | TEMPERATURE: 97.2 F | SYSTOLIC BLOOD PRESSURE: 130 MMHG | RESPIRATION RATE: 16 BRPM | HEIGHT: 63 IN | WEIGHT: 190 LBS | BODY MASS INDEX: 33.66 KG/M2

## 2023-09-08 DIAGNOSIS — R09.82 POSTNASAL DRIP: ICD-10-CM

## 2023-09-08 PROCEDURE — 99214 OFFICE O/P EST MOD 30 MIN: CPT

## 2023-09-08 RX ORDER — ALBUTEROL SULFATE 90 UG/1
108 (90 BASE) AEROSOL, METERED RESPIRATORY (INHALATION) EVERY 6 HOURS
Qty: 1 | Refills: 5 | Status: ACTIVE | COMMUNITY
Start: 2019-05-28 | End: 1900-01-01

## 2023-09-08 NOTE — REASON FOR VISIT
[Follow-Up] : a follow-up visit [FreeTextEntry1] : abnormal lung CT, nicotine addiction, PND, poor sleep OSAS, and SOB

## 2023-09-08 NOTE — PROCEDURE
[FreeTextEntry1] : Sleep study (1.28.2019) revealed sleep apnea with an AHI/MAYA of 4.2, snore index of 30.0 and a low oxygen saturation of 87%

## 2023-09-08 NOTE — ASSESSMENT
[FreeTextEntry1] : Ms. Zuniga 68 year old with a history of hypertension, elevated cholesterol, hyperthyroidism, anxiety, prior pneumonia now comes to office for pulmonary re-evaluation. She continues to smoke around 1/2 ppd - s/p bronchitis (8/2019) - c/w asthma (stable) - but trying to stop smoking / mild dyspnea - improved with smoking cessation-#1 issue is Apnea episode - likely c/w SEA   Her shortness of breath is multifactorial:  overweight poor mechanics of breathing ?cardiac disease (Dr. Sanchez) lung reasons--? copd/asthma  problem 1: ? asthma/COPD (due to smoker 30 years) -(s/p asthmatic bronchitis) -continue generic Advair (250) 1 inhalation BID or Symbicort 160 2 inhalations BID  -add Symbicort 160 2 inhalations BID (generic is fine) - recommend Methacholine challenge to detect asthma - continue Ventolin 2 inhalations q6H Asthma is believed to be caused by inherited (genetic) and environmental factor, but its exact cause is unknown. Asthma may be triggered by allergens, lung infections, or irritants in the air. Asthma triggers are different for each person -COPD is a progressive disease and although it can't be cured , appropriate management can slow its progression, reduce frequency and severity of exacerbations, and improve symptoms and the patient quality of life. Hospitalizations are the greatest contributor to the total COPD costs and account for up to 87% of total COPD related costs. Exacerbations are the main cause of admissions and subsequently account for the 40-75% of COPD costs. Inhaled maintenance therapy reduces the incidence of exacerbations in patients with stable COPD. Incorrect inhaler use and nonadherence are major obstacles to achieving COPD treatment goals. Many COPD patients have challenges (impaired inhalation, limited dexterity, reduced cognition: that limit their ability to correctly use their COPD treatment devices resulting in reduced symptom control. Of most importance is smoking cessation and early intervention with respiratory illnesses and contemplation for pulmonary rehab to enhance quality of life.   problem 2: post nasal drip -  recommended Xlear saline solution  Environmental measures for allergies were encouraged including mattress and pillow cover, air purifier, and environmental controls   problem 3: nicotine addiction (discussed 1/26/2023) -Refused Nicotrol inhaler -recommended to see [facility] for assistance  Discussed for five minutes with the patient the risks/associations with continued smoking including COPD, emphysema, shortness of breath, renal cancer, bladder cancer, stroke risk, cardiac disease, etc. Smoking cessation was discussed at length and highly encouraged. Various options to aid cessation was discussed including use of Chantix, Nicotrol, nicotine products, laser therapy, hypnosis, Wellbutrin, etc   problem 4: lung cancer screening/abnormal CT of Chest - stable  -2 different types of nodules: 1 possibly due to measles and chicken pox and 1 ground glass nodule possibly related to bronchitis or pneumonia - S/p chest CT 4/2019; s/p CT 8/2021 - 8/2022- next 10/2023   problem 5: Primary Snoring (SEA NOT Present 2019) - New Apnea *Risks: snoring, elevated Mallampati cleft, fatigue - S/p home sleep study 2019 - In lab sleep study  -recommended mouthpiece  -recommended to use "Oxy Aid"  - Sleep apnea is associated with adverse clinical consequences which an affect most organ systems. Cardiovascular disease risk includes arrhythmias, atrial fibrillation, hypertension, coronary artery disease, and stroke. Metabolic disorders include diabetes type 2, non-alcoholic fatty liver disease. Mood disorder especially depression; and cognitive decline especially in the elderly. Associations with chronic reflux/Montilla's esophagus some but not all inclusive. -Reasons include arousal consistent with hypopnea; respiratory events most prominent in REM sleep or supine position; therefore sleep staging and body position are important for accurate diagnosis and estimation of AHI. Good sleep hygiene was encouraged including avoiding watching television an hour before bed, keeping caffeine at a low, avoiding reading, television, or anything, in bed, no drinking any liquids three hours before bedtime, and only getting into bed when tired and ready for sleep.  problem 6: overweight -Recommended "Food Fuels" etc  -Weight loss, exercise, and diet control were discussed and are highly encouraged. Treatment options were given such as, aqua therapy, and contacting a nutritionist. Recommended to use the elliptical, stationary bike, less use of treadmill. Mindful eating was explained to the patient Obesity is associated with worsening asthma, shortness of breath, and potential for cardiac disease, diabetes, and other underlying medical conditions.   Problem 8: Health Maintenance/COVID19 Precautions: -s/p Covid-19 vaccine - recommended Sanotyze  - Clean your hands often. Wash your hands often with soap and water for at least 20 seconds, especially after blowing your nose, coughing, or sneezing, or having been in a public place. - If soap and water are not available, use a hand  that contains at least 60% alcohol. - To the extent possible, avoid touching high-touch surfaces in public places - elevator buttons, door handles, handrails, handshaking with people, etc. Use a tissue or your sleeve to cover your hand or finger if you must touch something. - Wash your hands after touching surfaces in public places. - Avoid touching your face, nose, eyes, etc. - Clean and disinfect your home to remove germs: practice routine cleaning of frequently touched surfaces (for example: tables, doorknobs, light switches, handles, desks, toilets, faucets, sinks & cell phones) - Avoid crowds, especially in poorly ventilated spaces. Your risk of exposure to respiratory viruses like COVID-19 may increase in crowded, closed-in settings with little air circulation if there are people in the crowd who are sick. All patients are recommended to practice social distancing and stay at least 6 feet away from others. - Avoid all non-essential travel including plane trips, and especially avoid embarking on cruise ships. -If COVID-19 is spreading in your community, take extra measures to put distance between yourself and other people to further reduce your risk of being exposed to this new virus. -Stay home as much as possible. - Consider ways of getting food brought to your house through family, social, or commercial networks -Be aware that the virus has been known to live in the air up to 3 hours post exposure, cardboard up to 24 hours post exposure, copper up to 4 hours post exposure, steel and plastic up to 2-3 days post exposure. Risk of transmission from these surfaces are affected by many variables.  Immune Support Recommendations: -OTC Vitamin C 500mg BID  -OTC Quercetin 250-500mg BID  -OTC Zinc 75-100mg per day  -OTC Melatonin 1or 2mg a night  -OTC Vitamin D 1-4000mg per day -Tonic Water 8oz -Recommended to Stay Hydrated (At least a gallon/day)  Asthma and COVID19: You need to make sure your asthma is under control. This often requires the use of inhaled corticosteroids (and sometimes oral corticosteroids). Inhaled corticosteroids do not likely reduce your immune system's ability to fight infections, but oral corticosteroids may. It is important to use the steps above to protect yourself to limit your exposure to any respiratory virus.   problem 8: health maintenance -s/p influenza shot - 2022 -recommended strep pneumonia vaccines: Prevnar-13 vaccine, followed by Pneumo vaccine 23 one year following (completed) -recommended early intervention for URIs -recommended regular osteoporosis evaluations -recommended early dermatological evaluations -recommended after the age of 50 to the age of 70, colonoscopy every 5 years   F/U in 6-8 weeks. She is encouraged to call with any changes, concerns, or questions

## 2023-09-08 NOTE — PHYSICAL EXAM
[No Acute Distress] : no acute distress [Normal Oropharynx] : normal oropharynx [III] : Mallampati Class: III [Normal Appearance] : normal appearance [No Neck Mass] : no neck mass [Normal Rate/Rhythm] : normal rate/rhythm [Normal S1, S2] : normal s1, s2 [No Resp Distress] : no resp distress [Clear to Auscultation Bilaterally] : clear to auscultation bilaterally [No Abnormalities] : no abnormalities [Benign] : benign [Normal Gait] : normal gait [No Clubbing] : no clubbing [No Cyanosis] : no cyanosis [No Edema] : no edema [FROM] : FROM [Normal Color/ Pigmentation] : normal color/ pigmentation [No Focal Deficits] : no focal deficits [Oriented x3] : oriented x3 [Normal Affect] : normal affect [TextBox_2] : ow [TextBox_54] : 2/6 systolic murmur [TextBox_68] : I:E 1:3, clear

## 2023-09-08 NOTE — ADDENDUM
[FreeTextEntry1] : Documented by Alvin Batista acting as a scribe for Dr. Sunny Redmond on 09/08/2023.   All medical record entries made by the Scribe were at my, Dr. Sunny Redmond's, direction and personally dictated by me on 09/08/2023. I have reviewed the chart and agree that the record accurately reflects my personal performance of the history, physical exam, assessment and plan. I have also personally directed, reviewed, and agree with the discharge instructions.

## 2023-09-08 NOTE — HISTORY OF PRESENT ILLNESS
[FreeTextEntry1] : Ms. Zuniga is a 68 year old female with a history of abnormal lung CT, nicotine addiction, PND, poor sleep, and SOB presenting to the office today for a follow up visit. Her chief complaint is   - she notes waking up on the morning of 9/3 and couldn't breath  - she notes she was not able to get a full breath in and her ribs hurt - she notes needing to take a Xanax - she notes not drinking much the weekend prior to her breathing episode - she notes having rhinitis and sneezing  - she notes her rib pain resided over the week - she notes having a hard time taking a deep breath multiple times this week but not as bad as the episode on 9/3 - she notes getting a lot of sleep but still always feels fatigued  - she notes snoring - she notes she has gained weight since her last sleep study in 2019   -she denies any headaches, nausea, emesis, fever, chills, sweats, chest pain, chest pressure, coughing, wheezing, palpitations, constipation, diarrhea, vertigo, dysphagia, heartburn, reflux, itchy eyes, itchy ears, leg swelling, leg pain, arthralgias, myalgias, or sour taste in the mouth.

## 2023-11-28 ENCOUNTER — NON-APPOINTMENT (OUTPATIENT)
Age: 68
End: 2023-11-28

## 2023-11-28 VITALS — HEIGHT: 63 IN | BODY MASS INDEX: 33.66 KG/M2 | WEIGHT: 190 LBS

## 2023-11-28 DIAGNOSIS — F17.200 NICOTINE DEPENDENCE, UNSPECIFIED, UNCOMPLICATED: ICD-10-CM

## 2023-11-29 ENCOUNTER — OUTPATIENT (OUTPATIENT)
Dept: OUTPATIENT SERVICES | Facility: HOSPITAL | Age: 68
LOS: 1 days | End: 2023-11-29
Payer: MEDICARE

## 2023-11-29 ENCOUNTER — APPOINTMENT (OUTPATIENT)
Dept: CT IMAGING | Facility: CLINIC | Age: 68
End: 2023-11-29
Payer: MEDICARE

## 2023-11-29 DIAGNOSIS — R91.8 OTHER NONSPECIFIC ABNORMAL FINDING OF LUNG FIELD: ICD-10-CM

## 2023-11-29 DIAGNOSIS — F17.210 NICOTINE DEPENDENCE, CIGARETTES, UNCOMPLICATED: Chronic | ICD-10-CM

## 2023-11-29 DIAGNOSIS — Z98.890 OTHER SPECIFIED POSTPROCEDURAL STATES: Chronic | ICD-10-CM

## 2023-11-29 DIAGNOSIS — R91.8 OTHER NONSPECIFIC ABNORMAL FINDING OF LUNG FIELD: Chronic | ICD-10-CM

## 2023-11-29 PROCEDURE — 71271 CT THORAX LUNG CANCER SCR C-: CPT | Mod: 26

## 2023-11-29 PROCEDURE — 71271 CT THORAX LUNG CANCER SCR C-: CPT

## 2023-12-07 ENCOUNTER — APPOINTMENT (OUTPATIENT)
Dept: PULMONOLOGY | Facility: CLINIC | Age: 68
End: 2023-12-07
Payer: MEDICARE

## 2023-12-07 VITALS
RESPIRATION RATE: 16 BRPM | HEIGHT: 63 IN | DIASTOLIC BLOOD PRESSURE: 82 MMHG | OXYGEN SATURATION: 98 % | BODY MASS INDEX: 34.98 KG/M2 | SYSTOLIC BLOOD PRESSURE: 130 MMHG | HEART RATE: 78 BPM | TEMPERATURE: 97.5 F | WEIGHT: 197.4 LBS

## 2023-12-07 DIAGNOSIS — F17.200 NICOTINE DEPENDENCE, UNSPECIFIED, UNCOMPLICATED: ICD-10-CM

## 2023-12-07 PROCEDURE — 95012 NITRIC OXIDE EXP GAS DETER: CPT

## 2023-12-07 PROCEDURE — 94010 BREATHING CAPACITY TEST: CPT

## 2023-12-07 PROCEDURE — 99214 OFFICE O/P EST MOD 30 MIN: CPT | Mod: 25

## 2023-12-11 ENCOUNTER — TRANSCRIPTION ENCOUNTER (OUTPATIENT)
Age: 68
End: 2023-12-11

## 2023-12-12 ENCOUNTER — TRANSCRIPTION ENCOUNTER (OUTPATIENT)
Age: 68
End: 2023-12-12

## 2024-01-24 ENCOUNTER — APPOINTMENT (OUTPATIENT)
Dept: BARIATRICS/WEIGHT MGMT | Facility: CLINIC | Age: 69
End: 2024-01-24
Payer: MEDICARE

## 2024-01-24 ENCOUNTER — OUTPATIENT (OUTPATIENT)
Dept: OUTPATIENT SERVICES | Facility: HOSPITAL | Age: 69
LOS: 1 days | End: 2024-01-24
Payer: MEDICARE

## 2024-01-24 DIAGNOSIS — E11.9 TYPE 2 DIABETES MELLITUS W/OUT COMPLICATIONS: ICD-10-CM

## 2024-01-24 DIAGNOSIS — R91.8 OTHER NONSPECIFIC ABNORMAL FINDING OF LUNG FIELD: Chronic | ICD-10-CM

## 2024-01-24 DIAGNOSIS — Z98.890 OTHER SPECIFIED POSTPROCEDURAL STATES: Chronic | ICD-10-CM

## 2024-01-24 DIAGNOSIS — F17.210 NICOTINE DEPENDENCE, CIGARETTES, UNCOMPLICATED: Chronic | ICD-10-CM

## 2024-01-24 PROCEDURE — 99204 OFFICE O/P NEW MOD 45 MIN: CPT | Mod: 95

## 2024-01-24 PROCEDURE — G0463: CPT

## 2024-01-24 RX ORDER — TIRZEPATIDE 2.5 MG/.5ML
2.5 INJECTION, SOLUTION SUBCUTANEOUS
Qty: 1 | Refills: 5 | Status: ACTIVE | COMMUNITY
Start: 2024-01-24 | End: 1900-01-01

## 2024-01-24 NOTE — ASSESSMENT
[FreeTextEntry1] : BARIATRIC SURGERY HISTORY:  OBESITY COMORBIDITIES:  ANTI-OBESITY MEDICATIONS:  OBESITY MEDICATION SIDE EFFECTS:  Recommend the following: check metabolic labs whole foods based eating strategy limiting refined carbs and added fats keep food journal track daily activity more moderate intensity cardio rtc in 4 weeks.

## 2024-01-24 NOTE — HISTORY OF PRESENT ILLNESS
[FreeTextEntry1] : 67 yo woman with obesity, DM2  during COVID pandemic was working out every day, felt great and then was found to  threw out her back diagnosed with diabetes during this time  most recent weight = close to 200 lbs height = 5'3 max lifetime weight = 203  SOCIAL: works part time - works in sales at [employer] lives with  and adult daughter   FOOD: not a big breakfast eater black coffee first thing in the AM a couple of hours later, greek yogurt, might have a bagel with butter pasta for dinner, burgers, no chicken allergic reaction to kylie kylie very fussy about food particulars  PHYSICAL ACTIVITY: no substantial physical activity - had back injury now a lot of aches/pains, etc  SLEEP: just had polysomnography spends a fair amount of time in bed, gets up to go to bathroom in middle - still tired in the AM (not feeling energized)  STRESS: some family stuff - tends to stress eat  Please refer to intake forms for complete weight and related history.

## 2024-01-25 DIAGNOSIS — I10 ESSENTIAL (PRIMARY) HYPERTENSION: ICD-10-CM

## 2024-01-26 ENCOUNTER — APPOINTMENT (OUTPATIENT)
Dept: PULMONOLOGY | Facility: CLINIC | Age: 69
End: 2024-01-26
Payer: MEDICARE

## 2024-01-26 PROCEDURE — 99442: CPT | Mod: 93

## 2024-01-29 ENCOUNTER — TRANSCRIPTION ENCOUNTER (OUTPATIENT)
Age: 69
End: 2024-01-29

## 2024-02-15 RX ORDER — TIRZEPATIDE 5 MG/.5ML
5 INJECTION, SOLUTION SUBCUTANEOUS
Qty: 4 | Refills: 5 | Status: ACTIVE | COMMUNITY
Start: 2024-02-15 | End: 1900-01-01

## 2024-04-10 RX ORDER — TIRZEPATIDE 7.5 MG/.5ML
7.5 INJECTION, SOLUTION SUBCUTANEOUS
Qty: 1 | Refills: 1 | Status: ACTIVE | COMMUNITY
Start: 2024-04-10 | End: 1900-01-01

## 2024-04-26 ENCOUNTER — OUTPATIENT (OUTPATIENT)
Dept: OUTPATIENT SERVICES | Facility: HOSPITAL | Age: 69
LOS: 1 days | End: 2024-04-26
Payer: MEDICARE

## 2024-04-26 ENCOUNTER — APPOINTMENT (OUTPATIENT)
Dept: BARIATRICS/WEIGHT MGMT | Facility: CLINIC | Age: 69
End: 2024-04-26
Payer: MEDICARE

## 2024-04-26 DIAGNOSIS — R91.8 OTHER NONSPECIFIC ABNORMAL FINDING OF LUNG FIELD: Chronic | ICD-10-CM

## 2024-04-26 DIAGNOSIS — F17.210 NICOTINE DEPENDENCE, CIGARETTES, UNCOMPLICATED: Chronic | ICD-10-CM

## 2024-04-26 DIAGNOSIS — I10 ESSENTIAL (PRIMARY) HYPERTENSION: ICD-10-CM

## 2024-04-26 DIAGNOSIS — Z98.890 OTHER SPECIFIED POSTPROCEDURAL STATES: Chronic | ICD-10-CM

## 2024-04-26 DIAGNOSIS — E66.3 OVERWEIGHT: ICD-10-CM

## 2024-04-26 PROCEDURE — 99213 OFFICE O/P EST LOW 20 MIN: CPT | Mod: 95

## 2024-04-26 PROCEDURE — G0463: CPT

## 2024-05-27 NOTE — HISTORY OF PRESENT ILLNESS
[FreeTextEntry1] : 69 woman with DM2 and obesity returns for f/u  has been on tirzeaptide.  at first was concerned about tachycardia but this resolved almost immedicately feels eating and blood sugars under better control now up to 7.5 mg of mounjaro  happy with progress.  Otherwise without new complaints today.
Neurology

## 2024-05-27 NOTE — REASON FOR VISIT
[Home] : at home, [unfilled] , at the time of the visit. [Other Location: e.g. Home (Enter Location, City,State)___] : at [unfilled] [Patient] : the patient [Follow-Up] : a follow-up visit [FreeTextEntry1] : obesity/DM2

## 2024-05-27 NOTE — ASSESSMENT
[FreeTextEntry1] : BARIATRIC SURGERY HISTORY: none  OBESITY COMORBIDITIES: Dm2, HTN, HLD  ANTI-OBESITY MEDICATIONS: none  OBESITY MEDICATION SIDE EFFECTS: n/1  Recommend the following:  whole foods based eating strategy limiting refined carbs and added fats - recommend plant based approach will need vit B12 supplementation if adherent to these recommendations increase physical activity as tolerated cont tirzepatide now up to 7.5mg   rtc in 6-8 weeks.

## 2024-06-20 ENCOUNTER — APPOINTMENT (OUTPATIENT)
Dept: PULMONOLOGY | Facility: CLINIC | Age: 69
End: 2024-06-20
Payer: MEDICARE

## 2024-06-20 VITALS
DIASTOLIC BLOOD PRESSURE: 70 MMHG | RESPIRATION RATE: 16 BRPM | BODY MASS INDEX: 31.01 KG/M2 | WEIGHT: 175 LBS | HEIGHT: 63 IN | HEART RATE: 69 BPM | SYSTOLIC BLOOD PRESSURE: 124 MMHG | OXYGEN SATURATION: 97 % | TEMPERATURE: 97.2 F

## 2024-06-20 DIAGNOSIS — R91.8 OTHER NONSPECIFIC ABNORMAL FINDING OF LUNG FIELD: ICD-10-CM

## 2024-06-20 DIAGNOSIS — E66.3 OVERWEIGHT: ICD-10-CM

## 2024-06-20 DIAGNOSIS — J44.89 OTHER SPECIFIED CHRONIC OBSTRUCTIVE PULMONARY DISEASE: ICD-10-CM

## 2024-06-20 DIAGNOSIS — R06.02 SHORTNESS OF BREATH: ICD-10-CM

## 2024-06-20 DIAGNOSIS — Z72.820 SLEEP DEPRIVATION: ICD-10-CM

## 2024-06-20 DIAGNOSIS — G47.30 SLEEP APNEA, UNSPECIFIED: ICD-10-CM

## 2024-06-20 DIAGNOSIS — F17.200 NICOTINE DEPENDENCE, UNSPECIFIED, UNCOMPLICATED: ICD-10-CM

## 2024-06-20 PROCEDURE — 94729 DIFFUSING CAPACITY: CPT

## 2024-06-20 PROCEDURE — 94727 GAS DIL/WSHOT DETER LNG VOL: CPT

## 2024-06-20 PROCEDURE — ZZZZZ: CPT

## 2024-06-20 PROCEDURE — 94010 BREATHING CAPACITY TEST: CPT

## 2024-06-20 PROCEDURE — 99214 OFFICE O/P EST MOD 30 MIN: CPT | Mod: 25

## 2024-06-20 PROCEDURE — 95012 NITRIC OXIDE EXP GAS DETER: CPT

## 2024-06-20 NOTE — HISTORY OF PRESENT ILLNESS
[FreeTextEntry1] : Ms. Zuniga is a 69 year old female with a history of abnormal lung CT, nicotine addiction, PND, poor sleep, and SOB presenting to the office today for a follow up visit. Her chief complaint is   she notes feeling well  She notes exercising via pilates  -she notes that her energy levels is poor -she notes feeling tired and sleepier and has sleep apnea -she notes her mouthpiece is finally ready  she notes Her bowels are regular...  she notes that her appetite is good.  -she notes that her diet is good   -Patient denies any headaches, nausea, vomiting, fever, chills, sweats, chest pain, chest pressure, palpitations, coughing, wheezing, diarrhea, constipation, dysphagia, arthralgias, myalgias, dizziness, leg swelling, leg pain, itchy eyes, itchy ears, dysphonia, heartburn, reflux or sour taste in mouth.

## 2024-06-20 NOTE — PROCEDURE
[FreeTextEntry1] :  Full PFT reveals normal flows; FEV1 was 2.27 L which is 105 % of predicted, normal lung volumes, normal diffusions, at 17.6 L which is 82% predicted, normal flow volume loop. PFT's for performed to evaluate for SOB.    FENO was 26; a normal value being less than 25. Fractional exhaled nitric oxide (FENO) is regarded as a simple, noninvasive method for assessing eosinophilic airway inflammation. Produced by a variety of cells within the lung, nitric oxide (NO) concentrations are generally low in healthy individuals. However, high concentrations of NO appear to be involved in nonspecific host defense mechanisms and chronic inflammatory diseases such as asthma. The American Thoracic Society (ATS) therefore has strongly recommended using FENO to aid in the assessment, management, and long-term monitoring of eosinophilic airway inflammation and asthma, and for identifying steroid responsive individuals whose chronic respiratory symptoms may be caused by airway inflammation. In their 2011 clinical practice guideline, the ATS emphasizes the importance of using FENO.

## 2024-06-20 NOTE — ASSESSMENT
[FreeTextEntry1] : Ms. Zuniga 69 year old with a history of hypertension, elevated cholesterol, hyperthyroidism, anxiety, prior pneumonia now comes to office for pulmonary re-evaluation. She continues to smoke around 1/2 ppd - s/p bronchitis (8/2019) - c/w asthma (stable) - but trying to stop smoking / mild dyspnea - improved with smoking cessation-#1 issue is Apnea episodes - likely c/w SEA ([place])-pending rx  Her shortness of breath is multifactorial: overweight poor mechanics of breathing ?cardiac disease (Dr. Sanchez) lung reasons--? COPD/asthma  problem 1: ? asthma/COPD (due to smoker 30 years) -(s/p asthmatic bronchitis) -continue generic Advair (250) 1 inhalation BID or Symbicort 160 2 inhalations BID -add Symbicort 160 2 inhalations BID (generic is fine) - recommend Methacholine challenge to detect asthma - continue Ventolin 2 inhalations q6H Asthma is believed to be caused by inherited (genetic) and environmental factor, but its exact cause is unknown. Asthma may be triggered by allergens, lung infections, or irritants in the air. Asthma triggers are different for each person -COPD is a progressive disease and although it can't be cured , appropriate management can slow its progression, reduce frequency and severity of exacerbations, and improve symptoms and the patient quality of life. Hospitalizations are the greatest contributor to the total COPD costs and account for up to 87% of total COPD related costs. Exacerbations are the main cause of admissions and subsequently account for the 40-75% of COPD costs. Inhaled maintenance therapy reduces the incidence of exacerbations in patients with stable COPD. Incorrect inhaler use and nonadherence are major obstacles to achieving COPD treatment goals. Many COPD patients have challenges (impaired inhalation, limited dexterity, reduced cognition: that limit their ability to correctly use their COPD treatment devices resulting in reduced symptom control. Of most importance is smoking cessation and early intervention with respiratory illnesses and contemplation for pulmonary rehab to enhance quality of life.  problem 2: post nasal drip - recommended Xlear saline solution Environmental measures for allergies were encouraged including mattress and pillow cover, air purifier, and environmental controls  problem 3: nicotine addiction (discussed 1/26/2023) -Refused Nicotrol inhaler -recommended to see [facility] for assistance Discussed for five minutes with the patient the risks/associations with continued smoking including COPD, emphysema, shortness of breath, renal cancer, bladder cancer, stroke risk, cardiac disease, etc. Smoking cessation was discussed at length and highly encouraged. Various options to aid cessation was discussed including use of Chantix, Nicotrol, nicotine products, laser therapy, hypnosis, Wellbutrin, etc  problem 4: lung cancer screening/abnormal CT of Chest - stable -2 different types of nodules: 1 possibly due to measles and chicken pox and 1 ground glass nodule possibly related to bronchitis or pneumonia - S/p chest CT 4/2019; s/p CT 8/2021 - 8/2022- 12/2023, 11/2024  problem 5: Primary Snoring (SEA NOT Present 2019) - New Apnea *Risks: snoring, elevated Mallampati cleft, fatigue - S/p home sleep study 2019 - In lab sleep study (HSS)- DD pending -recommended mouthpiece -recommended to use "Oxy Aid" - Sleep apnea is associated with adverse clinical consequences which an affect most organ systems. Cardiovascular disease risk includes arrhythmias, atrial fibrillation, hypertension, coronary artery disease, and stroke. Metabolic disorders include diabetes type 2, non-alcoholic fatty liver disease. Mood disorder especially depression; and cognitive decline especially in the elderly. Associations with chronic reflux/Montilla's esophagus some but not all inclusive. -Reasons include arousal consistent with hypopnea; respiratory events most prominent in REM sleep or supine position; therefore sleep staging and body position are important for accurate diagnosis and estimation of AHI. Good sleep hygiene was encouraged including avoiding watching television an hour before bed, keeping caffeine at a low, avoiding reading, television, or anything, in bed, no drinking any liquids three hours before bedtime, and only getting into bed when tired and ready for sleep.  problem 6: overweight -Kev et al -Recommended "Food Fuels" etc -recommended Berberine OTC -Weight loss, exercise, and diet control were discussed and are highly encouraged. Treatment options were given such as, aqua therapy, and contacting a nutritionist. Recommended to use the elliptical, stationary bike, less use of treadmill. Mindful eating was explained to the patient Obesity is associated with worsening asthma, shortness of breath, and potential for cardiac disease, diabetes, and other underlying medical conditions.  Problem 8: Health Maintenance/COVID19 Precautions: -s/p Covid-19 vaccine - recommended Sanotyze - Clean your hands often. Wash your hands often with soap and water for at least 20 seconds, especially after blowing your nose, coughing, or sneezing, or having been in a public place. - If soap and water are not available, use a hand  that contains at least 60% alcohol. - To the extent possible, avoid touching high-touch surfaces in public places - elevator buttons, door handles, handrails, handshaking with people, etc. Use a tissue or your sleeve to cover your hand or finger if you must touch something. - Wash your hands after touching surfaces in public places. - Avoid touching your face, nose, eyes, etc. - Clean and disinfect your home to remove germs: practice routine cleaning of frequently touched surfaces (for example: tables, doorknobs, light switches, handles, desks, toilets, faucets, sinks & cell phones) - Avoid crowds, especially in poorly ventilated spaces. Your risk of exposure to respiratory viruses like COVID-19 may increase in crowded, closed-in settings with little air circulation if there are people in the crowd who are sick. All patients are recommended to practice social distancing and stay at least 6 feet away from others. - Avoid all non-essential travel including plane trips, and especially avoid embarking on cruise ships. -If COVID-19 is spreading in your community, take extra measures to put distance between yourself and other people to further reduce your risk of being exposed to this new virus. -Stay home as much as possible. - Consider ways of getting food brought to your house through family, social, or commercial networks -Be aware that the virus has been known to live in the air up to 3 hours post exposure, cardboard up to 24 hours post exposure, copper up to 4 hours post exposure, steel and plastic up to 2-3 days post exposure. Risk of transmission from these surfaces are affected by many variables.  Immune Support Recommendations: -OTC Vitamin C 500mg BID -OTC Quercetin 250-500mg BID -OTC Zinc 75-100mg per day -OTC Melatonin 1or 2mg a night -OTC Vitamin D 1-4000mg per day -Tonic Water 8oz -Recommended to Stay Hydrated (At least a gallon/day)  Asthma and COVID19: You need to make sure your asthma is under control. This often requires the use of inhaled corticosteroids (and sometimes oral corticosteroids). Inhaled corticosteroids do not likely reduce your immune system's ability to fight infections, but oral corticosteroids may. It is important to use the steps above to protect yourself to limit your exposure to any respiratory virus.  problem 8: health maintenance -recommended Quercetin and SPM for aches and pains  -s/p influenza shot - 2023 -recommended strep pneumonia vaccines: Prevnar-13 vaccine, followed by Pneumo vaccine 23 one year following (completed) -recommended early intervention for URIs -recommended regular osteoporosis evaluations -recommended early dermatological evaluations -recommended after the age of 50 to the age of 70, colonoscopy every 5 years  F/U in 4-6 months. She is encouraged to call with any changes, concerns, or questions.

## 2024-06-20 NOTE — ADDENDUM
[FreeTextEntry1] :  Documented by Ren Leal acting as a scribe for Dr. Sunny Redmond on 06/20/2024 .   All medical record entries made by the Scribe were at my, Dr. Sunny Redmond's direction and personally dictated by me on 06/20/2024 . I have reviewed the chart and agree that the record accurately reflects my personal performance of the history, Physical exam, assessment, and plan. I have also personally directed, reviewed, and agree with the discharge instructions.

## 2024-06-20 NOTE — PHYSICAL EXAM

## 2024-07-08 ENCOUNTER — RX RENEWAL (OUTPATIENT)
Age: 69
End: 2024-07-08

## 2024-07-22 ENCOUNTER — NON-APPOINTMENT (OUTPATIENT)
Age: 69
End: 2024-07-22

## 2024-07-23 ENCOUNTER — APPOINTMENT (OUTPATIENT)
Dept: BARIATRICS/WEIGHT MGMT | Facility: CLINIC | Age: 69
End: 2024-07-23
Payer: MEDICARE

## 2024-07-23 VITALS
BODY MASS INDEX: 30.55 KG/M2 | HEIGHT: 63 IN | HEART RATE: 75 BPM | SYSTOLIC BLOOD PRESSURE: 124 MMHG | OXYGEN SATURATION: 97 % | WEIGHT: 172.4 LBS | DIASTOLIC BLOOD PRESSURE: 72 MMHG

## 2024-07-23 DIAGNOSIS — R73.03 PREDIABETES.: ICD-10-CM

## 2024-07-23 DIAGNOSIS — E66.3 OVERWEIGHT: ICD-10-CM

## 2024-07-23 PROCEDURE — G2211 COMPLEX E/M VISIT ADD ON: CPT

## 2024-07-23 PROCEDURE — 99213 OFFICE O/P EST LOW 20 MIN: CPT

## 2024-07-25 NOTE — ASSESSMENT
[FreeTextEntry1] : BARIATRIC SURGERY HISTORY: none  OBESITY COMORBIDITIES: Dm2, HTN, HLD  ANTI-OBESITY MEDICATIONS: tirzepatide  OBESITY MEDICATION SIDE EFFECTS: tachycardia at first but resolved quickly  Recommend the following:  whole foods based eating strategy limiting refined carbs and added fats - recommend plant based approach will need vit B12 supplementation if adherent to these recommendations increase physical activity as tolerated ok to increase tirzepatide to 10mg as tolerated   rtc in 6-8 weeks.

## 2024-07-25 NOTE — HISTORY OF PRESENT ILLNESS
[FreeTextEntry1] : 69 woman with DM2 and obesity returns for f/u  max weight - 203 lbs, and now down 31 from max and 28 lbs in past 6 months feels eating and blood sugars under better control now up to 7.5 mg of mounjaro  happy with progress. though has slowed down of late  Otherwise without new complaints today.

## 2024-08-08 ENCOUNTER — OUTPATIENT (OUTPATIENT)
Dept: OUTPATIENT SERVICES | Facility: HOSPITAL | Age: 69
LOS: 1 days | End: 2024-08-08
Payer: MEDICARE

## 2024-08-08 ENCOUNTER — APPOINTMENT (OUTPATIENT)
Dept: BARIATRICS/WEIGHT MGMT | Facility: CLINIC | Age: 69
End: 2024-08-08

## 2024-08-08 DIAGNOSIS — F17.210 NICOTINE DEPENDENCE, CIGARETTES, UNCOMPLICATED: Chronic | ICD-10-CM

## 2024-08-08 DIAGNOSIS — I10 ESSENTIAL (PRIMARY) HYPERTENSION: ICD-10-CM

## 2024-08-08 PROCEDURE — G2211 COMPLEX E/M VISIT ADD ON: CPT

## 2024-08-08 PROCEDURE — G0463: CPT

## 2024-08-08 PROCEDURE — 99214 OFFICE O/P EST MOD 30 MIN: CPT

## 2024-08-08 NOTE — ASSESSMENT
[FreeTextEntry1] : BARIATRIC SURGERY HISTORY: none  OBESITY COMORBIDITIES: Dm2, HTN, HLD  ANTI-OBESITY MEDICATIONS: tirzepatide  OBESITY MEDICATION SIDE EFFECTS: tachycardia at first but resolved quickly  Recommend the following: whole foods based eating strategy limiting refined carbs and added fats - Recommend increase dietary fiber and get at least 2 meals/day (lunch and dinner).  will need vit B12 supplementation if adherent to these recommendations increase physical activity as tolerated. To start working with . Doing pilates.  ok to increase tirzepatide to 10mg as tolerated discussed good outcome to date as pt losing 1lb/week and 30 lbs overall (around 15% EBWL).    rtc in 1 month w/Dr Angulo and 2 months w/me.

## 2024-08-08 NOTE — HISTORY OF PRESENT ILLNESS
[FreeTextEntry1] : 69 woman with DM2 and obesity returns for f/u  max weight - 203 lbs, and now down 33 from max and 30 lbs in past 6 months  Interim hx: Pt referred by Dr Angulo for lifestyle modification On Mounjaro 7.5mg, tolerating well. No side effects. Appetite well controlled.  Last A1C 5.8% Had back injury a few years back that lead to wt gain.  Prior was very into fitness/working out.  Has been active. Doing Pilates. Ran into old  and wants to get back to working with  Diet recall: -skips breakfast -will have Organe shake during day -eats dinner. Only having 1-2 full meals/day -some increase in sugar cravings recently. daughter on similar path with wt loss/meds.  -reports sleep is poor -pt interested in nutrition resources to try and make further adjustments.   Otherwise without new complaints today.

## 2024-08-12 DIAGNOSIS — E11.9 TYPE 2 DIABETES MELLITUS WITHOUT COMPLICATIONS: ICD-10-CM

## 2024-08-12 DIAGNOSIS — E66.9 OBESITY, UNSPECIFIED: ICD-10-CM

## 2024-09-13 ENCOUNTER — APPOINTMENT (OUTPATIENT)
Dept: BARIATRICS/WEIGHT MGMT | Facility: CLINIC | Age: 69
End: 2024-09-13
Payer: MEDICARE

## 2024-09-13 ENCOUNTER — OUTPATIENT (OUTPATIENT)
Dept: OUTPATIENT SERVICES | Facility: HOSPITAL | Age: 69
LOS: 1 days | End: 2024-09-13

## 2024-09-13 DIAGNOSIS — E66.9 OBESITY, UNSPECIFIED: ICD-10-CM

## 2024-09-13 DIAGNOSIS — I10 ESSENTIAL (PRIMARY) HYPERTENSION: ICD-10-CM

## 2024-09-13 DIAGNOSIS — F17.210 NICOTINE DEPENDENCE, CIGARETTES, UNCOMPLICATED: Chronic | ICD-10-CM

## 2024-09-13 DIAGNOSIS — E11.9 TYPE 2 DIABETES MELLITUS W/OUT COMPLICATIONS: ICD-10-CM

## 2024-09-13 DIAGNOSIS — R91.8 OTHER NONSPECIFIC ABNORMAL FINDING OF LUNG FIELD: Chronic | ICD-10-CM

## 2024-09-13 DIAGNOSIS — Z98.890 OTHER SPECIFIED POSTPROCEDURAL STATES: Chronic | ICD-10-CM

## 2024-09-13 PROCEDURE — 99213 OFFICE O/P EST LOW 20 MIN: CPT | Mod: 95

## 2024-09-13 PROCEDURE — G2211 COMPLEX E/M VISIT ADD ON: CPT | Mod: 95

## 2024-09-13 RX ORDER — TIRZEPATIDE 10 MG/.5ML
10 INJECTION, SOLUTION SUBCUTANEOUS
Qty: 1 | Refills: 5 | Status: ACTIVE | COMMUNITY
Start: 2024-09-13 | End: 1900-01-01

## 2024-09-17 NOTE — HISTORY OF PRESENT ILLNESS
[FreeTextEntry1] : 69 woman with DM2 and obesity returns for f/u  max weight - 203 lbs,  current weight = 169 lbs (down 3 lbs from last visit and now 33 from max) weight had dropped to 165 lbs or so but has had a very stressful month and feels she is stress eating more regularly now (combination of hunger and stress) her baseline eating remains better still working with NP  Otherwise without new complaints today.

## 2024-09-17 NOTE — REASON FOR VISIT
[Follow-Up] : a follow-up visit [Home] : at home, [unfilled] , at the time of the visit. [Other Location: e.g. Home (Enter Location, City,State)___] : at [unfilled] [Patient] : the patient [FreeTextEntry1] : obesity/DM2

## 2024-09-17 NOTE — ASSESSMENT
[FreeTextEntry1] : BARIATRIC SURGERY HISTORY: none  OBESITY COMORBIDITIES: Dm2, HTN, HLD  ANTI-OBESITY MEDICATIONS: tirzepatide  OBESITY MEDICATION SIDE EFFECTS: tachycardia at first but resolved quickly  Recommend the following:  cont attempt at whole foods based eating strategy limiting refined carbs and added fats - recommend plant forward approach increase physical activity as tolerated ok to increase tirzepatide to10mg   rtc in 6-8 weeks.

## 2024-10-07 ENCOUNTER — OUTPATIENT (OUTPATIENT)
Dept: OUTPATIENT SERVICES | Facility: HOSPITAL | Age: 69
LOS: 1 days | End: 2024-10-07
Payer: MEDICARE

## 2024-10-07 ENCOUNTER — APPOINTMENT (OUTPATIENT)
Dept: BARIATRICS/WEIGHT MGMT | Facility: CLINIC | Age: 69
End: 2024-10-07
Payer: MEDICARE

## 2024-10-07 VITALS — BODY MASS INDEX: 29.59 KG/M2 | WEIGHT: 167 LBS | HEIGHT: 63 IN

## 2024-10-07 DIAGNOSIS — E11.9 TYPE 2 DIABETES MELLITUS W/OUT COMPLICATIONS: ICD-10-CM

## 2024-10-07 DIAGNOSIS — F17.210 NICOTINE DEPENDENCE, CIGARETTES, UNCOMPLICATED: Chronic | ICD-10-CM

## 2024-10-07 DIAGNOSIS — I10 ESSENTIAL (PRIMARY) HYPERTENSION: ICD-10-CM

## 2024-10-07 DIAGNOSIS — Z98.890 OTHER SPECIFIED POSTPROCEDURAL STATES: Chronic | ICD-10-CM

## 2024-10-07 DIAGNOSIS — E66.811 OBESITY, CLASS 1: ICD-10-CM

## 2024-10-07 DIAGNOSIS — R91.8 OTHER NONSPECIFIC ABNORMAL FINDING OF LUNG FIELD: Chronic | ICD-10-CM

## 2024-10-07 PROCEDURE — G2211 COMPLEX E/M VISIT ADD ON: CPT | Mod: 95

## 2024-10-07 PROCEDURE — 99214 OFFICE O/P EST MOD 30 MIN: CPT | Mod: 95

## 2024-10-21 DIAGNOSIS — E11.9 TYPE 2 DIABETES MELLITUS WITHOUT COMPLICATIONS: ICD-10-CM

## 2024-10-21 DIAGNOSIS — E66.811 OBESITY, CLASS 1: ICD-10-CM

## 2024-10-25 ENCOUNTER — APPOINTMENT (OUTPATIENT)
Dept: BARIATRICS/WEIGHT MGMT | Facility: CLINIC | Age: 69
End: 2024-10-25
Payer: MEDICARE

## 2024-10-25 ENCOUNTER — OUTPATIENT (OUTPATIENT)
Dept: OUTPATIENT SERVICES | Facility: HOSPITAL | Age: 69
LOS: 1 days | End: 2024-10-25
Payer: MEDICARE

## 2024-10-25 DIAGNOSIS — R91.8 OTHER NONSPECIFIC ABNORMAL FINDING OF LUNG FIELD: Chronic | ICD-10-CM

## 2024-10-25 DIAGNOSIS — I10 ESSENTIAL (PRIMARY) HYPERTENSION: ICD-10-CM

## 2024-10-25 DIAGNOSIS — F17.210 NICOTINE DEPENDENCE, CIGARETTES, UNCOMPLICATED: Chronic | ICD-10-CM

## 2024-10-25 DIAGNOSIS — Z98.890 OTHER SPECIFIED POSTPROCEDURAL STATES: Chronic | ICD-10-CM

## 2024-10-25 DIAGNOSIS — E66.811 OBESITY, CLASS 1: ICD-10-CM

## 2024-10-25 PROCEDURE — 99213 OFFICE O/P EST LOW 20 MIN: CPT | Mod: 95

## 2024-10-25 PROCEDURE — G2211 COMPLEX E/M VISIT ADD ON: CPT | Mod: 95

## 2024-11-04 DIAGNOSIS — E66.811 OBESITY, CLASS 1: ICD-10-CM

## 2024-11-20 PROCEDURE — G0463: CPT

## 2024-12-02 ENCOUNTER — APPOINTMENT (OUTPATIENT)
Dept: BARIATRICS/WEIGHT MGMT | Facility: CLINIC | Age: 69
End: 2024-12-02
Payer: MEDICARE

## 2024-12-02 ENCOUNTER — OUTPATIENT (OUTPATIENT)
Dept: OUTPATIENT SERVICES | Facility: HOSPITAL | Age: 69
LOS: 1 days | End: 2024-12-02
Payer: MEDICARE

## 2024-12-02 VITALS — BODY MASS INDEX: 29.06 KG/M2 | HEIGHT: 63 IN | WEIGHT: 164 LBS

## 2024-12-02 DIAGNOSIS — E66.811 OBESITY, CLASS 1: ICD-10-CM

## 2024-12-02 DIAGNOSIS — Z98.890 OTHER SPECIFIED POSTPROCEDURAL STATES: Chronic | ICD-10-CM

## 2024-12-02 DIAGNOSIS — R91.8 OTHER NONSPECIFIC ABNORMAL FINDING OF LUNG FIELD: Chronic | ICD-10-CM

## 2024-12-02 DIAGNOSIS — E11.9 TYPE 2 DIABETES MELLITUS W/OUT COMPLICATIONS: ICD-10-CM

## 2024-12-02 DIAGNOSIS — F17.210 NICOTINE DEPENDENCE, CIGARETTES, UNCOMPLICATED: Chronic | ICD-10-CM

## 2024-12-02 DIAGNOSIS — I10 ESSENTIAL (PRIMARY) HYPERTENSION: ICD-10-CM

## 2024-12-02 PROCEDURE — G0463: CPT

## 2024-12-02 PROCEDURE — 99214 OFFICE O/P EST MOD 30 MIN: CPT | Mod: 95

## 2024-12-02 PROCEDURE — G2211 COMPLEX E/M VISIT ADD ON: CPT | Mod: 95

## 2024-12-09 DIAGNOSIS — E11.9 TYPE 2 DIABETES MELLITUS WITHOUT COMPLICATIONS: ICD-10-CM

## 2024-12-09 DIAGNOSIS — E66.811 OBESITY, CLASS 1: ICD-10-CM

## 2024-12-11 ENCOUNTER — APPOINTMENT (OUTPATIENT)
Dept: PULMONOLOGY | Facility: CLINIC | Age: 69
End: 2024-12-11

## 2024-12-25 PROBLEM — F10.90 ALCOHOL USE: Status: ACTIVE | Noted: 2017-08-01

## 2025-02-03 ENCOUNTER — APPOINTMENT (OUTPATIENT)
Dept: BARIATRICS/WEIGHT MGMT | Facility: CLINIC | Age: 70
End: 2025-02-03

## 2025-03-05 ENCOUNTER — RX RENEWAL (OUTPATIENT)
Age: 70
End: 2025-03-05

## 2025-03-05 RX ORDER — TIRZEPATIDE 10 MG/.5ML
10 INJECTION, SOLUTION SUBCUTANEOUS
Qty: 2 | Refills: 5 | Status: ACTIVE | COMMUNITY
Start: 2025-03-05 | End: 1900-01-01

## 2025-03-12 ENCOUNTER — OUTPATIENT (OUTPATIENT)
Dept: OUTPATIENT SERVICES | Facility: HOSPITAL | Age: 70
LOS: 1 days | End: 2025-03-12

## 2025-03-12 ENCOUNTER — APPOINTMENT (OUTPATIENT)
Dept: BARIATRICS/WEIGHT MGMT | Facility: CLINIC | Age: 70
End: 2025-03-12
Payer: MEDICARE

## 2025-03-12 DIAGNOSIS — F17.210 NICOTINE DEPENDENCE, CIGARETTES, UNCOMPLICATED: Chronic | ICD-10-CM

## 2025-03-12 DIAGNOSIS — E66.811 OBESITY, CLASS 1: ICD-10-CM

## 2025-03-12 DIAGNOSIS — R91.8 OTHER NONSPECIFIC ABNORMAL FINDING OF LUNG FIELD: Chronic | ICD-10-CM

## 2025-03-12 DIAGNOSIS — I10 ESSENTIAL (PRIMARY) HYPERTENSION: ICD-10-CM

## 2025-03-12 PROCEDURE — 99213 OFFICE O/P EST LOW 20 MIN: CPT | Mod: 95

## 2025-03-13 NOTE — REVIEW OF SYSTEMS
Pls see if there is a appt in building MD booked needs acute appt abd pain    [Negative] : Endocrine

## 2025-03-24 ENCOUNTER — NON-APPOINTMENT (OUTPATIENT)
Age: 70
End: 2025-03-24

## 2025-03-24 VITALS — HEIGHT: 63 IN | WEIGHT: 164 LBS | BODY MASS INDEX: 29.06 KG/M2

## 2025-03-24 DIAGNOSIS — F17.200 NICOTINE DEPENDENCE, UNSPECIFIED, UNCOMPLICATED: ICD-10-CM

## 2025-03-25 ENCOUNTER — APPOINTMENT (OUTPATIENT)
Dept: CT IMAGING | Facility: CLINIC | Age: 70
End: 2025-03-25

## 2025-03-25 ENCOUNTER — OUTPATIENT (OUTPATIENT)
Dept: OUTPATIENT SERVICES | Facility: HOSPITAL | Age: 70
LOS: 1 days | End: 2025-03-25
Payer: MEDICARE

## 2025-03-25 DIAGNOSIS — F17.210 NICOTINE DEPENDENCE, CIGARETTES, UNCOMPLICATED: Chronic | ICD-10-CM

## 2025-03-25 DIAGNOSIS — Z98.890 OTHER SPECIFIED POSTPROCEDURAL STATES: Chronic | ICD-10-CM

## 2025-03-25 DIAGNOSIS — R91.8 OTHER NONSPECIFIC ABNORMAL FINDING OF LUNG FIELD: Chronic | ICD-10-CM

## 2025-03-25 DIAGNOSIS — R91.8 OTHER NONSPECIFIC ABNORMAL FINDING OF LUNG FIELD: ICD-10-CM

## 2025-03-25 PROCEDURE — 71271 CT THORAX LUNG CANCER SCR C-: CPT

## 2025-03-25 PROCEDURE — 71271 CT THORAX LUNG CANCER SCR C-: CPT | Mod: 26

## 2025-04-28 ENCOUNTER — OUTPATIENT (OUTPATIENT)
Dept: OUTPATIENT SERVICES | Facility: HOSPITAL | Age: 70
LOS: 1 days | End: 2025-04-28

## 2025-04-28 ENCOUNTER — APPOINTMENT (OUTPATIENT)
Dept: BARIATRICS/WEIGHT MGMT | Facility: CLINIC | Age: 70
End: 2025-04-28
Payer: MEDICARE

## 2025-04-28 VITALS — WEIGHT: 162 LBS | HEIGHT: 63 IN | BODY MASS INDEX: 28.7 KG/M2

## 2025-04-28 DIAGNOSIS — I10 ESSENTIAL (PRIMARY) HYPERTENSION: ICD-10-CM

## 2025-04-28 DIAGNOSIS — R91.8 OTHER NONSPECIFIC ABNORMAL FINDING OF LUNG FIELD: Chronic | ICD-10-CM

## 2025-04-28 DIAGNOSIS — E66.811 OBESITY, CLASS 1: ICD-10-CM

## 2025-04-28 DIAGNOSIS — F17.210 NICOTINE DEPENDENCE, CIGARETTES, UNCOMPLICATED: Chronic | ICD-10-CM

## 2025-04-28 DIAGNOSIS — Z98.890 OTHER SPECIFIED POSTPROCEDURAL STATES: Chronic | ICD-10-CM

## 2025-04-28 DIAGNOSIS — E11.9 TYPE 2 DIABETES MELLITUS W/OUT COMPLICATIONS: ICD-10-CM

## 2025-04-28 PROCEDURE — 99214 OFFICE O/P EST MOD 30 MIN: CPT | Mod: 95

## 2025-04-28 PROCEDURE — G2211 COMPLEX E/M VISIT ADD ON: CPT | Mod: 95

## 2025-05-01 ENCOUNTER — TRANSCRIPTION ENCOUNTER (OUTPATIENT)
Age: 70
End: 2025-05-01

## 2025-05-05 DIAGNOSIS — E11.9 TYPE 2 DIABETES MELLITUS WITHOUT COMPLICATIONS: ICD-10-CM

## 2025-05-05 DIAGNOSIS — E66.811 OBESITY, CLASS 1: ICD-10-CM

## 2025-05-20 ENCOUNTER — TRANSCRIPTION ENCOUNTER (OUTPATIENT)
Age: 70
End: 2025-05-20

## 2025-05-20 RX ORDER — TIRZEPATIDE 12.5 MG/.5ML
12.5 INJECTION, SOLUTION SUBCUTANEOUS
Qty: 12 | Refills: 1 | Status: ACTIVE | COMMUNITY
Start: 2025-05-20 | End: 1900-01-01

## 2025-06-20 ENCOUNTER — APPOINTMENT (OUTPATIENT)
Dept: BARIATRICS/WEIGHT MGMT | Facility: CLINIC | Age: 70
End: 2025-06-20
Payer: MEDICARE

## 2025-06-20 ENCOUNTER — OUTPATIENT (OUTPATIENT)
Dept: OUTPATIENT SERVICES | Facility: HOSPITAL | Age: 70
LOS: 1 days | End: 2025-06-20

## 2025-06-20 DIAGNOSIS — I10 ESSENTIAL (PRIMARY) HYPERTENSION: ICD-10-CM

## 2025-06-20 DIAGNOSIS — R91.8 OTHER NONSPECIFIC ABNORMAL FINDING OF LUNG FIELD: Chronic | ICD-10-CM

## 2025-06-20 DIAGNOSIS — Z98.890 OTHER SPECIFIED POSTPROCEDURAL STATES: Chronic | ICD-10-CM

## 2025-06-20 PROCEDURE — G2211 COMPLEX E/M VISIT ADD ON: CPT | Mod: 95

## 2025-06-20 PROCEDURE — 99213 OFFICE O/P EST LOW 20 MIN: CPT | Mod: 95

## 2025-08-26 ENCOUNTER — OUTPATIENT (OUTPATIENT)
Dept: OUTPATIENT SERVICES | Facility: HOSPITAL | Age: 70
LOS: 1 days | End: 2025-08-26

## 2025-08-26 ENCOUNTER — APPOINTMENT (OUTPATIENT)
Dept: BARIATRICS/WEIGHT MGMT | Facility: CLINIC | Age: 70
End: 2025-08-26
Payer: MEDICARE

## 2025-08-26 VITALS — BODY MASS INDEX: 28.35 KG/M2 | HEIGHT: 63 IN | WEIGHT: 160 LBS

## 2025-08-26 DIAGNOSIS — R91.8 OTHER NONSPECIFIC ABNORMAL FINDING OF LUNG FIELD: Chronic | ICD-10-CM

## 2025-08-26 DIAGNOSIS — F17.210 NICOTINE DEPENDENCE, CIGARETTES, UNCOMPLICATED: Chronic | ICD-10-CM

## 2025-08-26 DIAGNOSIS — I10 ESSENTIAL (PRIMARY) HYPERTENSION: ICD-10-CM

## 2025-08-26 DIAGNOSIS — Z98.890 OTHER SPECIFIED POSTPROCEDURAL STATES: Chronic | ICD-10-CM

## 2025-08-26 DIAGNOSIS — E11.9 TYPE 2 DIABETES MELLITUS W/OUT COMPLICATIONS: ICD-10-CM

## 2025-08-26 DIAGNOSIS — E66.811 OBESITY, CLASS 1: ICD-10-CM

## 2025-08-26 PROCEDURE — 99214 OFFICE O/P EST MOD 30 MIN: CPT | Mod: 95

## 2025-08-26 PROCEDURE — G2211 COMPLEX E/M VISIT ADD ON: CPT | Mod: 95

## 2025-09-03 DIAGNOSIS — E11.9 TYPE 2 DIABETES MELLITUS WITHOUT COMPLICATIONS: ICD-10-CM

## 2025-09-03 DIAGNOSIS — E66.811 OBESITY, CLASS 1: ICD-10-CM
